# Patient Record
Sex: FEMALE | Race: WHITE | NOT HISPANIC OR LATINO | Employment: OTHER | URBAN - METROPOLITAN AREA
[De-identification: names, ages, dates, MRNs, and addresses within clinical notes are randomized per-mention and may not be internally consistent; named-entity substitution may affect disease eponyms.]

---

## 2023-12-28 ENCOUNTER — OFFICE VISIT (OUTPATIENT)
Dept: PRIMARY CARE | Facility: CLINIC | Age: 68
End: 2023-12-28
Payer: MEDICARE

## 2023-12-28 VITALS
DIASTOLIC BLOOD PRESSURE: 70 MMHG | TEMPERATURE: 97.1 F | BODY MASS INDEX: 23.04 KG/M2 | HEIGHT: 63 IN | RESPIRATION RATE: 15 BRPM | SYSTOLIC BLOOD PRESSURE: 118 MMHG | WEIGHT: 130 LBS | HEART RATE: 66 BPM | OXYGEN SATURATION: 97 %

## 2023-12-28 DIAGNOSIS — I27.21 PULMONARY ARTERY HYPERTENSION ASSOCIATED WITH CONNECTIVE TISSUE DISEASE (MULTI): ICD-10-CM

## 2023-12-28 DIAGNOSIS — G11.9 CEREBELLAR ATAXIA (MULTI): ICD-10-CM

## 2023-12-28 DIAGNOSIS — M35.9 PULMONARY ARTERY HYPERTENSION ASSOCIATED WITH CONNECTIVE TISSUE DISEASE (MULTI): ICD-10-CM

## 2023-12-28 DIAGNOSIS — Z13.820 ENCOUNTER FOR OSTEOPOROSIS SCREENING IN ASYMPTOMATIC POSTMENOPAUSAL PATIENT: ICD-10-CM

## 2023-12-28 DIAGNOSIS — I50.32 CHRONIC DIASTOLIC HEART FAILURE (MULTI): ICD-10-CM

## 2023-12-28 DIAGNOSIS — Z78.0 ENCOUNTER FOR OSTEOPOROSIS SCREENING IN ASYMPTOMATIC POSTMENOPAUSAL PATIENT: ICD-10-CM

## 2023-12-28 DIAGNOSIS — M34.9 SCLERODERMA (MULTI): Primary | ICD-10-CM

## 2023-12-28 DIAGNOSIS — J84.10 PULMONARY INTERSTITIAL FIBROSIS (MULTI): ICD-10-CM

## 2023-12-28 PROCEDURE — 1036F TOBACCO NON-USER: CPT | Performed by: FAMILY MEDICINE

## 2023-12-28 PROCEDURE — 1159F MED LIST DOCD IN RCRD: CPT | Performed by: FAMILY MEDICINE

## 2023-12-28 PROCEDURE — 99203 OFFICE O/P NEW LOW 30 MIN: CPT | Performed by: FAMILY MEDICINE

## 2023-12-28 PROCEDURE — 1160F RVW MEDS BY RX/DR IN RCRD: CPT | Performed by: FAMILY MEDICINE

## 2023-12-28 RX ORDER — LEVOTHYROXINE SODIUM 137 UG/1
137 TABLET ORAL
COMMUNITY
End: 2024-02-15 | Stop reason: SDUPTHER

## 2023-12-28 RX ORDER — AMLODIPINE BESYLATE 10 MG/1
10 TABLET ORAL
COMMUNITY
Start: 2022-02-24 | End: 2024-02-15 | Stop reason: SDUPTHER

## 2023-12-28 RX ORDER — FLUOXETINE 10 MG/1
10 CAPSULE ORAL
COMMUNITY
End: 2024-02-15 | Stop reason: SDUPTHER

## 2023-12-28 RX ORDER — ATORVASTATIN CALCIUM 40 MG/1
40 TABLET, FILM COATED ORAL
COMMUNITY
Start: 2022-02-24 | End: 2024-02-15 | Stop reason: SDUPTHER

## 2023-12-28 RX ORDER — PANTOPRAZOLE SODIUM 40 MG/1
40 TABLET, DELAYED RELEASE ORAL
COMMUNITY
Start: 2022-02-24 | End: 2024-02-15 | Stop reason: SDUPTHER

## 2023-12-28 RX ORDER — ASPIRIN 81 MG/1
81 TABLET ORAL
COMMUNITY
Start: 2022-02-24

## 2023-12-28 RX ORDER — TADALAFIL 20 MG/1
40 TABLET ORAL
COMMUNITY
Start: 2023-12-01

## 2023-12-28 NOTE — PROGRESS NOTES
Assessment     ASSESSMENT/PLAN:      Problem List Items Addressed This Visit       Scleroderma (CMS/[facility]) - Primary    Relevant Orders    Referral to Cardiology    Referral to Rheumatology    Pulmonary artery hypertension associated with connective tissue disease (CMS/[facility])    Relevant Orders    Referral to Pulmonology    Pulmonary interstitial fibrosis (CMS/[facility])    Relevant Orders    Referral to Pulmonology    Cerebellar ataxia (CMS/[facility])     Other Visit Diagnoses       Chronic diastolic heart failure (CMS/[facility])        Relevant Medications    macitentan (Opsumit) 10 mg tablet tablet    tadalafil (tadalafil, antihypertensive,) 20 mg tablet    amLODIPine (Norvasc) 10 mg tablet    Other Relevant Orders    Referral to Cardiology    Encounter for osteoporosis screening in asymptomatic postmenopausal patient        Relevant Orders    XR DEXA bone density            Patient Instructions:  Patient Instructions   Agnosia: try smell retraining   Scleroderma/PAH/diastolic heart failure: Will refer to cardiology, pulmonology, rheumatology  History of osteoporosis: Will send referral for DEXA scan      Signed by: Nickie Velásquez DO       FUTURE DIRECTION:   []    Subjective   SUBJECTIVE:     HPI : Patient is a 68 y.o. female who presents today for the following:     Recently moved from [facility]    Pulmonary arterial hypertension  -Secondary to connective tissue disease  -Following cardiology    Scleroderma  -With systemic symptoms    Agnosia   - ongoing for many years     History of CVA  - has residual weakness in LE R>L and has slurred speech     HTN  - amlodipine 10mg     HLD  - lipitor 40mg daily     Depression   - fuloxetine 10mg     GERD  - pantoprazole     Hypothyroidism   Levothyroxine 137mcg           Review of Systems    History reviewed. No pertinent past medical history.     History reviewed. No pertinent surgical history.     Current Outpatient Medications   Medication Instructions    amLODIPine (NORVASC) 10 mg,  "oral, Daily RT    aspirin 81 mg, oral, Daily RT    atorvastatin (LIPITOR) 40 mg, oral, Daily RT    FLUoxetine (PROZAC) 10 mg, oral, Daily RT    levothyroxine (SYNTHROID, LEVOXYL) 137 mcg, oral, Daily RT    macitentan (OPSUMIT) 10 mg, oral, Daily RT    omega-3 fatty acids-fish oil 340-1,000 mg capsule 1 capsule, oral    pantoprazole (PROTONIX) 40 mg, oral, Daily RT    tadalafil (CIALIS) 40 mg, oral, Daily RT        No Known Allergies     Social History     Socioeconomic History    Marital status:      Spouse name: Not on file    Number of children: Not on file    Years of education: Not on file    Highest education level: Not on file   Occupational History    Not on file   Tobacco Use    Smoking status: Never    Smokeless tobacco: Never   Substance and Sexual Activity    Alcohol use: Not on file    Drug use: Not on file    Sexual activity: Not on file   Other Topics Concern    Not on file   Social History Narrative    Not on file     Social Determinants of Health     Financial Resource Strain: Not on file   Food Insecurity: Not on file   Transportation Needs: Not on file   Physical Activity: Not on file   Stress: Not on file   Social Connections: Not on file   Intimate Partner Violence: Not on file   Housing Stability: Not on file        No family history on file.     Objective     OBJECTIVE:     Vitals:    12/28/23 1328   BP: 118/70   Pulse: 66   Resp: 15   Temp: 36.2 °C (97.1 °F)   SpO2: 97%   Weight: 59 kg (130 lb)   Height: 1.6 m (5' 3\")        Physical Exam  Constitutional:       Appearance: Normal appearance.   HENT:      Head: Normocephalic.   Pulmonary:      Effort: Pulmonary effort is normal.   Musculoskeletal:      Cervical back: Normal range of motion.   Neurological:      Mental Status: She is alert.   Psychiatric:         Mood and Affect: Mood normal.             "

## 2023-12-28 NOTE — PATIENT INSTRUCTIONS
Agnosia: try smell retraining   Scleroderma/PAH/diastolic heart failure: Will refer to cardiology, pulmonology, rheumatology  History of osteoporosis: Will send referral for DEXA scan

## 2024-01-17 ENCOUNTER — APPOINTMENT (OUTPATIENT)
Dept: PULMONOLOGY | Facility: HOSPITAL | Age: 69
End: 2024-01-17
Payer: MEDICARE

## 2024-01-24 ENCOUNTER — APPOINTMENT (OUTPATIENT)
Dept: CARDIOLOGY | Facility: HOSPITAL | Age: 69
End: 2024-01-24
Payer: MEDICARE

## 2024-01-30 ENCOUNTER — HOSPITAL ENCOUNTER (OUTPATIENT)
Dept: RADIOLOGY | Facility: CLINIC | Age: 69
Discharge: HOME | End: 2024-01-30
Payer: MEDICARE

## 2024-01-30 DIAGNOSIS — Z13.820 ENCOUNTER FOR OSTEOPOROSIS SCREENING IN ASYMPTOMATIC POSTMENOPAUSAL PATIENT: ICD-10-CM

## 2024-01-30 DIAGNOSIS — Z78.0 ENCOUNTER FOR OSTEOPOROSIS SCREENING IN ASYMPTOMATIC POSTMENOPAUSAL PATIENT: ICD-10-CM

## 2024-01-30 PROCEDURE — 77080 DXA BONE DENSITY AXIAL: CPT | Performed by: RADIOLOGY

## 2024-01-30 PROCEDURE — 77080 DXA BONE DENSITY AXIAL: CPT

## 2024-01-31 ENCOUNTER — APPOINTMENT (OUTPATIENT)
Dept: RHEUMATOLOGY | Facility: CLINIC | Age: 69
End: 2024-01-31
Payer: MEDICARE

## 2024-02-15 ENCOUNTER — OFFICE VISIT (OUTPATIENT)
Dept: PRIMARY CARE | Facility: CLINIC | Age: 69
End: 2024-02-15
Payer: MEDICARE

## 2024-02-15 VITALS
DIASTOLIC BLOOD PRESSURE: 68 MMHG | HEIGHT: 64 IN | WEIGHT: 126 LBS | HEART RATE: 72 BPM | SYSTOLIC BLOOD PRESSURE: 132 MMHG | BODY MASS INDEX: 21.51 KG/M2 | TEMPERATURE: 98.3 F

## 2024-02-15 DIAGNOSIS — J84.10 PULMONARY INTERSTITIAL FIBROSIS (MULTI): ICD-10-CM

## 2024-02-15 DIAGNOSIS — M34.9 SCLERODERMA (MULTI): ICD-10-CM

## 2024-02-15 DIAGNOSIS — G11.9 CEREBELLAR ATAXIA (MULTI): ICD-10-CM

## 2024-02-15 DIAGNOSIS — K76.6 PAH (PULMONARY ARTERIAL HYPERTENSION) WITH PORTAL HYPERTENSION (MULTI): ICD-10-CM

## 2024-02-15 DIAGNOSIS — Z00.00 ROUTINE GENERAL MEDICAL EXAMINATION AT A HEALTH CARE FACILITY: Primary | ICD-10-CM

## 2024-02-15 DIAGNOSIS — Z12.31 SCREENING MAMMOGRAM FOR BREAST CANCER: ICD-10-CM

## 2024-02-15 DIAGNOSIS — I27.21 PAH (PULMONARY ARTERIAL HYPERTENSION) WITH PORTAL HYPERTENSION (MULTI): ICD-10-CM

## 2024-02-15 DIAGNOSIS — I10 ESSENTIAL HYPERTENSION: ICD-10-CM

## 2024-02-15 DIAGNOSIS — R73.01 IFG (IMPAIRED FASTING GLUCOSE): ICD-10-CM

## 2024-02-15 DIAGNOSIS — M81.0 AGE-RELATED OSTEOPOROSIS WITHOUT CURRENT PATHOLOGICAL FRACTURE: ICD-10-CM

## 2024-02-15 DIAGNOSIS — I50.32 CHRONIC DIASTOLIC HEART FAILURE (MULTI): ICD-10-CM

## 2024-02-15 DIAGNOSIS — I49.3 PREMATURE VENTRICULAR CONTRACTION: ICD-10-CM

## 2024-02-15 DIAGNOSIS — Z12.11 COLON CANCER SCREENING: ICD-10-CM

## 2024-02-15 DIAGNOSIS — E03.9 HYPOTHYROIDISM, UNSPECIFIED TYPE: ICD-10-CM

## 2024-02-15 DIAGNOSIS — I49.9 IRREGULAR HEART BEAT: ICD-10-CM

## 2024-02-15 DIAGNOSIS — F33.42 RECURRENT MAJOR DEPRESSIVE DISORDER, IN FULL REMISSION (CMS-HCC): ICD-10-CM

## 2024-02-15 DIAGNOSIS — E78.2 MIXED HYPERLIPIDEMIA: ICD-10-CM

## 2024-02-15 DIAGNOSIS — K21.9 GASTROESOPHAGEAL REFLUX DISEASE WITHOUT ESOPHAGITIS: ICD-10-CM

## 2024-02-15 DIAGNOSIS — Z12.31 ENCOUNTER FOR SCREENING MAMMOGRAM FOR MALIGNANT NEOPLASM OF BREAST: ICD-10-CM

## 2024-02-15 PROCEDURE — 1159F MED LIST DOCD IN RCRD: CPT | Performed by: FAMILY MEDICINE

## 2024-02-15 PROCEDURE — 1036F TOBACCO NON-USER: CPT | Performed by: FAMILY MEDICINE

## 2024-02-15 PROCEDURE — 1124F ACP DISCUSS-NO DSCNMKR DOCD: CPT | Performed by: FAMILY MEDICINE

## 2024-02-15 PROCEDURE — 3078F DIAST BP <80 MM HG: CPT | Performed by: FAMILY MEDICINE

## 2024-02-15 PROCEDURE — G0439 PPPS, SUBSEQ VISIT: HCPCS | Performed by: FAMILY MEDICINE

## 2024-02-15 PROCEDURE — 93000 ELECTROCARDIOGRAM COMPLETE: CPT | Performed by: FAMILY MEDICINE

## 2024-02-15 PROCEDURE — 1160F RVW MEDS BY RX/DR IN RCRD: CPT | Performed by: FAMILY MEDICINE

## 2024-02-15 PROCEDURE — 99214 OFFICE O/P EST MOD 30 MIN: CPT | Performed by: FAMILY MEDICINE

## 2024-02-15 PROCEDURE — 1170F FXNL STATUS ASSESSED: CPT | Performed by: FAMILY MEDICINE

## 2024-02-15 PROCEDURE — 3075F SYST BP GE 130 - 139MM HG: CPT | Performed by: FAMILY MEDICINE

## 2024-02-15 RX ORDER — ALENDRONATE SODIUM 70 MG/1
70 TABLET ORAL
Qty: 4 TABLET | Refills: 11 | Status: SHIPPED | OUTPATIENT
Start: 2024-02-15 | End: 2025-02-14

## 2024-02-15 RX ORDER — ATORVASTATIN CALCIUM 40 MG/1
40 TABLET, FILM COATED ORAL
Qty: 90 TABLET | Refills: 1 | Status: SHIPPED | OUTPATIENT
Start: 2024-02-15 | End: 2024-08-13

## 2024-02-15 RX ORDER — PANTOPRAZOLE SODIUM 40 MG/1
40 TABLET, DELAYED RELEASE ORAL
Qty: 90 TABLET | Refills: 1 | Status: SHIPPED | OUTPATIENT
Start: 2024-02-15 | End: 2024-08-13

## 2024-02-15 RX ORDER — AMLODIPINE BESYLATE 10 MG/1
10 TABLET ORAL DAILY
Qty: 90 TABLET | Refills: 1 | Status: SHIPPED | OUTPATIENT
Start: 2024-02-15 | End: 2024-08-13

## 2024-02-15 RX ORDER — LEVOTHYROXINE SODIUM 137 UG/1
137 TABLET ORAL DAILY
Qty: 90 TABLET | Refills: 1 | Status: SHIPPED | OUTPATIENT
Start: 2024-02-15 | End: 2024-08-13

## 2024-02-15 RX ORDER — FLUOXETINE 10 MG/1
10 CAPSULE ORAL DAILY
Qty: 90 CAPSULE | Refills: 1 | Status: SHIPPED | OUTPATIENT
Start: 2024-02-15 | End: 2024-08-13

## 2024-02-15 ASSESSMENT — PATIENT HEALTH QUESTIONNAIRE - PHQ9
SUM OF ALL RESPONSES TO PHQ9 QUESTIONS 1 AND 2: 0
1. LITTLE INTEREST OR PLEASURE IN DOING THINGS: NOT AT ALL
2. FEELING DOWN, DEPRESSED OR HOPELESS: NOT AT ALL

## 2024-02-15 ASSESSMENT — ACTIVITIES OF DAILY LIVING (ADL)
DOING_HOUSEWORK: INDEPENDENT
MANAGING_FINANCES: INDEPENDENT
GROCERY_SHOPPING: INDEPENDENT
DRESSING: INDEPENDENT
BATHING: INDEPENDENT
TAKING_MEDICATION: INDEPENDENT

## 2024-02-15 NOTE — PATIENT INSTRUCTIONS
1. Routine general medical examination at a health care facility    - Basic Metabolic Panel; Future  - Lipid Panel; Future    2. Encounter for screening mammogram for malignant neoplasm of breast  Referral placed for mammogram  - BI mammo bilateral screening tomosynthesis; Future    3. Hypothyroidism, unspecified type  Need to recheck TSH, levothyroxine refilled  - levothyroxine (Synthroid, Levoxyl) 137 mcg tablet; Take 1 tablet (137 mcg) by mouth once daily.  Dispense: 90 tablet; Refill: 1  - Tsh With Reflex To Free T4 If Abnormal; Future    4. PAH (pulmonary arterial hypertension) with portal hypertension (CMS/HCC)  Patient will contact pulmonology for tadalafil refill, has an appointment coming up soon    5. Scleroderma (CMS/HCC)  Patient has yet to find a rheumatologist    6. Chronic diastolic heart failure (CMS/HCC)      7. Pulmonary interstitial fibrosis (CMS/HCC)    8. Cerebellar ataxia (CMS/HCC)      9. Gastroesophageal reflux disease without esophagitis  Controlled with pantoprazole 40 mg daily  - pantoprazole (ProtoNix) 40 mg EC tablet; Take 1 tablet (40 mg) by mouth once daily.  Dispense: 90 tablet; Refill: 1    10. Recurrent major depressive disorder, in full remission (CMS/HCC)  Has been experiencing increased stress at home however mood has been stable with Prozac 10 mg daily  - FLUoxetine (PROzac) 10 mg capsule; Take 1 capsule (10 mg) by mouth once daily.  Dispense: 90 capsule; Refill: 1    11. Essential hypertension  Controlled with amlodipine 10 mg daily  - amLODIPine (Norvasc) 10 mg tablet; Take 1 tablet (10 mg) by mouth once daily.  Dispense: 90 tablet; Refill: 1    12. Age-related osteoporosis without current pathological fracture  DEXA scan + osteoporosis, will order Fosamax for patient to take weekly    - alendronate (Fosamax) 70 mg tablet; Take 1 tablet (70 mg) by mouth every 7 days. Take in the morning with a full glass of water, on an empty stomach, and do not take anything else by mouth or  lie down for the next 30 min.  Dispense: 4 tablet; Refill: 11  - Vitamin D 25-Hydroxy,Total (for eval of Vitamin D levels); Future    13. Mixed hyperlipidemia    - atorvastatin (Lipitor) 40 mg tablet; Take 1 tablet (40 mg) by mouth once daily.  Dispense: 90 tablet; Refill: 1    14. IFG (impaired fasting glucose)    - Hemoglobin A1C; Future    15. Colon cancer screening  Colonoscopy referral placed  - Colonoscopy Screening; Average Risk Patient; Future    16. Screening mammogram for breast cancer    - BI mammo bilateral screening tomosynthesis; Future    17. Irregular heart beat    - ECG 12 Lead    18. Premature ventricular contraction  EKG done in office, shows PVCs with sinus rhythm, no ST changes, discussed lifestyle modifications and continue follow-up with cardiology

## 2024-02-15 NOTE — PROGRESS NOTES
Assessment/Plan    ASSESSMENT/PLAN:      Patient Instructions   1. Routine general medical examination at a health care facility    - Basic Metabolic Panel; Future  - Lipid Panel; Future    2. Encounter for screening mammogram for malignant neoplasm of breast  Referral placed for mammogram  - BI mammo bilateral screening tomosynthesis; Future    3. Hypothyroidism, unspecified type  Need to recheck TSH, levothyroxine refilled  - levothyroxine (Synthroid, Levoxyl) 137 mcg tablet; Take 1 tablet (137 mcg) by mouth once daily.  Dispense: 90 tablet; Refill: 1  - Tsh With Reflex To Free T4 If Abnormal; Future    4. PAH (pulmonary arterial hypertension) with portal hypertension (CMS/HCC)  Patient will contact pulmonology for tadalafil refill, has an appointment coming up soon    5. Scleroderma (CMS/HCC)  Patient has yet to find a rheumatologist    6. Chronic diastolic heart failure (CMS/HCC)      7. Pulmonary interstitial fibrosis (CMS/HCC)    8. Cerebellar ataxia (CMS/HCC)      9. Gastroesophageal reflux disease without esophagitis  Controlled with pantoprazole 40 mg daily  - pantoprazole (ProtoNix) 40 mg EC tablet; Take 1 tablet (40 mg) by mouth once daily.  Dispense: 90 tablet; Refill: 1    10. Recurrent major depressive disorder, in full remission (CMS/HCC)  Has been experiencing increased stress at home however mood has been stable with Prozac 10 mg daily  - FLUoxetine (PROzac) 10 mg capsule; Take 1 capsule (10 mg) by mouth once daily.  Dispense: 90 capsule; Refill: 1    11. Essential hypertension  Controlled with amlodipine 10 mg daily  - amLODIPine (Norvasc) 10 mg tablet; Take 1 tablet (10 mg) by mouth once daily.  Dispense: 90 tablet; Refill: 1    12. Age-related osteoporosis without current pathological fracture  DEXA scan + osteoporosis, will order Fosamax for patient to take weekly    - alendronate (Fosamax) 70 mg tablet; Take 1 tablet (70 mg) by mouth every 7 days. Take in the morning with a full glass of  water, on an empty stomach, and do not take anything else by mouth or lie down for the next 30 min.  Dispense: 4 tablet; Refill: 11  - Vitamin D 25-Hydroxy,Total (for eval of Vitamin D levels); Future    13. Mixed hyperlipidemia    - atorvastatin (Lipitor) 40 mg tablet; Take 1 tablet (40 mg) by mouth once daily.  Dispense: 90 tablet; Refill: 1    14. IFG (impaired fasting glucose)    - Hemoglobin A1C; Future    15. Colon cancer screening  Colonoscopy referral placed  - Colonoscopy Screening; Average Risk Patient; Future    16. Screening mammogram for breast cancer    - BI mammo bilateral screening tomosynthesis; Future    17. Irregular heart beat    - ECG 12 Lead    18. Premature ventricular contraction  EKG done in office, shows PVCs with sinus rhythm, no ST changes, discussed lifestyle modifications and continue follow-up with cardiology           FUTURE DIRECTION:   []    Subjective   SUBJECTIVE:     Reason for Visit: Angelica Craven is an 68 y.o. female here for a Medicare Wellness visit.      Pulmonary arterial hypertension  -Secondary to connective tissue disease  -Following cardiology     Scleroderma  -With systemic symptoms like dysphagia, microstomia   - has not been following with dermatology      IgG lambda MGUS  Following hematology oncology yearly   No evidence of end organ damage     Agnosia   - ongoing for many years      History of CVA  - has residual weakness in LE R>L and has slurred speech      HTN  - amlodipine 10mg      HLD  - lipitor 40mg daily      Depression   - fuloxetine 10mg      GERD  - pantoprazole      Hypothyroidism   Levothyroxine 137mcg       Care Team   Cardiology: Ramona vazquez  Pulmonology: Soledad Yap     Past Medical, Surgical, and Family History reviewed and updated in chart.    Reviewed all medications by prescribing practitioner or clinical pharmacist (such as prescriptions, OTCs, herbal therapies and supplements) and documented in the medical record.    Patient Care  "Team:  Nickie Velásquez DO as PCP - General (Family Medicine)  Zander Dover MD as PCP - [employer] PCP     Review of Systems    Objective   OBJECTIVE:     Vitals:  /68   Pulse 72 Comment: irregular  Temp 36.8 °C (98.3 °F)   Ht 1.613 m (5' 3.5\")   Wt 57.2 kg (126 lb)   BMI 21.97 kg/m²       Physical Exam  HENT:      Head: Normocephalic and atraumatic.      Nose: Nose normal.      Mouth/Throat:      Mouth: Mucous membranes are moist.   Eyes:      Pupils: Pupils are equal, round, and reactive to light.   Cardiovascular:      Rate and Rhythm: Normal rate. Rhythm irregular.      Pulses: Normal pulses.      Heart sounds: No murmur heard.  Pulmonary:      Effort: Pulmonary effort is normal.      Breath sounds: Normal breath sounds.   Abdominal:      Tenderness: There is no abdominal tenderness.   Musculoskeletal:         General: Normal range of motion.      Cervical back: Normal range of motion.   Skin:     General: Skin is warm and dry.   Neurological:      Mental Status: She is alert.   Psychiatric:         Mood and Affect: Mood normal.            "

## 2024-02-20 ENCOUNTER — TELEPHONE (OUTPATIENT)
Dept: GASTROENTEROLOGY | Facility: CLINIC | Age: 69
End: 2024-02-20
Payer: MEDICARE

## 2024-02-20 NOTE — TELEPHONE ENCOUNTER
Left message on machine to return call  - needs OV for OA fail  heart issues, on oxygen, stroke 2021

## 2024-02-20 NOTE — TELEPHONE ENCOUNTER
----- Message from Rachel Saravia RN sent at 2/19/2024  3:46 PM EST -----  Regarding: Colonoscopy screening  Office visit

## 2024-03-06 ENCOUNTER — HOSPITAL ENCOUNTER (OUTPATIENT)
Dept: RADIOLOGY | Facility: HOSPITAL | Age: 69
Discharge: HOME | End: 2024-03-06
Payer: MEDICARE

## 2024-03-06 DIAGNOSIS — Z12.31 SCREENING MAMMOGRAM FOR BREAST CANCER: ICD-10-CM

## 2024-03-06 PROCEDURE — 77067 SCR MAMMO BI INCL CAD: CPT

## 2024-03-06 PROCEDURE — 77067 SCR MAMMO BI INCL CAD: CPT | Performed by: RADIOLOGY

## 2024-03-06 PROCEDURE — 77063 BREAST TOMOSYNTHESIS BI: CPT | Performed by: RADIOLOGY

## 2024-03-14 ENCOUNTER — HOSPITAL ENCOUNTER (OUTPATIENT)
Dept: RADIOLOGY | Facility: EXTERNAL LOCATION | Age: 69
Discharge: HOME | End: 2024-03-14

## 2024-03-19 ENCOUNTER — TELEPHONE (OUTPATIENT)
Dept: PRIMARY CARE | Facility: CLINIC | Age: 69
End: 2024-03-19
Payer: MEDICARE

## 2024-03-19 NOTE — TELEPHONE ENCOUNTER
----- Message from Nickie Velásquez DO sent at 3/19/2024  7:54 AM EDT -----  Please let pt know that mammogram is normal. Continue yearly screening.

## 2024-04-19 ENCOUNTER — OFFICE VISIT (OUTPATIENT)
Dept: GASTROENTEROLOGY | Facility: CLINIC | Age: 69
End: 2024-04-19
Payer: MEDICARE

## 2024-04-19 VITALS
SYSTOLIC BLOOD PRESSURE: 138 MMHG | BODY MASS INDEX: 22.5 KG/M2 | HEIGHT: 63 IN | DIASTOLIC BLOOD PRESSURE: 76 MMHG | WEIGHT: 127 LBS

## 2024-04-19 DIAGNOSIS — D12.6 ADENOMATOUS POLYP OF COLON, UNSPECIFIED PART OF COLON: Primary | ICD-10-CM

## 2024-04-19 PROCEDURE — 3075F SYST BP GE 130 - 139MM HG: CPT | Performed by: INTERNAL MEDICINE

## 2024-04-19 PROCEDURE — 1036F TOBACCO NON-USER: CPT | Performed by: INTERNAL MEDICINE

## 2024-04-19 PROCEDURE — 1159F MED LIST DOCD IN RCRD: CPT | Performed by: INTERNAL MEDICINE

## 2024-04-19 PROCEDURE — 99204 OFFICE O/P NEW MOD 45 MIN: CPT | Performed by: INTERNAL MEDICINE

## 2024-04-19 PROCEDURE — 3078F DIAST BP <80 MM HG: CPT | Performed by: INTERNAL MEDICINE

## 2024-04-19 PROCEDURE — 1160F RVW MEDS BY RX/DR IN RCRD: CPT | Performed by: INTERNAL MEDICINE

## 2024-04-19 NOTE — PROGRESS NOTES
[facility]    ASSESSMENT and PLAN:       Angelica Craven is a 69 y.o. female with a significant past medical history of scleroderma, pulmonary hypertension, pulm interstitial fibrosis, cerebellar ataxia, chronic diastolic heart failure who presents for consultation requested by her primary care provider (Nickie Velásquez DO) for the evaluation of open access failed.       History of colon polyps  -Patient with colonoscopy in 2018 in [place] recommend repeat in 5 years  -We will plan on colonoscopy in the OR due to patient underlying pulmonary hypertension  Risk and benefits of the endoscopic procedure including bleeding perforation and infection were discussed with patient and they wish to proceed              Orville Rehman DO         Gastroenterology    [facility]            Subjective   HISTORY OF PRESENT ILLNESS:     Chief Complaint  New Patient Visit (OA FAIL)    History Of Present Illness:    Angelica Craven is a 69 y.o. female with a significant past medical history of scleroderma, pulmonary hypertension, pulm interstitial fibrosis, cerebellar ataxia, chronic diastolic heart failure who presents for consultation requested by her primary care provider (Nickie Velásquez DO) for the evaluation of open access failed.       Patient denies any heartburn/GERD, N/V, dysphagia, odynophagia, abdominal pain, diarrhea, constipation, hematemesis, hematochezia, melena, or weight loss.      The liver biopsy results show a mild resolving hepatitis with NO fibrosis. She does not have cirrhosis or autoimmune hepatitis. The mild inflammation is likely related to her rheumatologic disorder (CREST/Scleroderma) and we can follow her liver numbers over time.    Her upper endoscopy was also reassuring - we did this as there was a question of a stomach mass on prior imaging. Her esophagus, stomach and small intestine were normal, also with  normal biopsies.      Endoscopy History:    Colon in [place] with    Patient Name: Angelica Craven Procedure Date: 12/7/2018 9:03 AM  YOB: 1955 Attending MD: ILAN BELLE MD  Age: 63 Note Status: Finalized  Account Number: [id number] MRN: [id number]  _______________________________________________________________________________    Procedure: Colonoscopy  Indications: Screening for colorectal malignant neoplasm, Last   colonoscopy 7 years ago  Providers: ILAN BELLE MD (Doctor), JIGNESH ALVAREZ, SMITH   (Nurse), DEBBIE CONTI RN (Nurse)  Referring MD: JOSE GOTTLIEB DO  Medicines:  Fentanyl 100 micrograms IV, Midazolam 6 mg IV  Complications: No immediate complications.  Procedure: Pre-Anesthesia Assessment:  - Pre-Anesthesia Assessment:  - Prior to the procedure, a History and Physical was   performed, and patient medication allergies have   been reviewed.Â  The patient's tolerance of previous   anesthesia has been reviewed.  - Review of systems indicates patient cleared for   procedure.  - The risks and benefits of the procedure and the   sedation options and risks were discussed with the   patient. All questions were answered and informed   consent was obtained.  - Patient identification and proposed procedure were   verified prior to the procedure by the physician and   the nurse.Â  The procedure was verified in the   procedure room.  - ASA Grade Assessment: II - A patient with mild   systemic disease.  - The anesthesia plan was to use moderate   sedation/analgesia (conscious sedation).  - The medication was administered by the nurse under   the direct supervision of the physician.  - The heart rate, respiratory rate, oxygen   saturations, blood pressure, adequacy of pulmonary   ventilation, and response to care were monitored   throughout the procedure by the nurse under the   direct supervision of the physician.  - The physical status of the patient was re-assessed   after the  procedure.  - Sedation began at: 09:12 AM  - The patient left the room at: 09:32 AM  After I obtained informed consent, the scope was   passed under direct vision. Throughout the   procedure, the patient's blood pressure, pulse, and   oxygen saturations were monitored continuously. The   OLYMPUS CF-H180AL COLONOSCOPE was introduced through   the anus and advanced to the cecum, identified by   appendiceal orifice and ileocecal valve. The   colonoscopy was performed without difficulty. The   patient tolerated the procedure well. The quality of   the bowel preparation was good.  Findings:  The perianal and digital rectal examinations were normal.  Two sessile polyps were found in the rectum. The polyps were small in   size. These polyps were removed with a jumbo cold forceps. Resection and   retrieval were complete.  A few small-mouthed diverticula were found in the sigmoid colon and in   the descending colon.  A few small angiodysplastic lesions without bleeding were found in the   sigmoid colon.  No additional abnormalities were found on retroflexion.  Impression:  - Two small polyps in the rectum. Resected and retrieved.  - Diverticulosis in the sigmoid colon and in the descending colon.  - A few non-bleeding colonic angiodysplastic lesions.  Estimated Blood Loss: Estimated blood loss: none.  Recommendation:  - Await pathology results.  - Repeat colonoscopy for surveillance based on pathology results.   Review of systems:   Review of Systems      I performed a complete 10 point review of systems and it is negative except as noted in HPI or above.        PAST HISTORIES:       Past Medical History:  She has no past medical history on file.    Past Surgical History:  She has a past surgical history that includes Breast biopsy.      Social History:  She reports that she has never smoked. She has never used smokeless tobacco. She reports that she does not drink alcohol and does not use drugs.    Family History:  No known  "GI disease, specifically denies pancreatitis, Crohn's, colon cancer, gastroesophageal cancer, or ulcerative colitis.    No family history on file.     Allergies:  Patient has no known allergies.        Objective   OBJECTIVE:       Last Recorded Vitals:  Vitals:    04/19/24 1100   BP: 138/76   Weight: 57.6 kg (127 lb)   Height: 1.6 m (5' 3\")     /76   Ht 1.6 m (5' 3\")   Wt 57.6 kg (127 lb)   BMI 22.50 kg/m²      Physical Exam:    Physical Exam  Vitals reviewed.   Constitutional:       General: She is awake.      Appearance: Normal appearance.   HENT:      Head: Normocephalic.      Mouth/Throat:      Mouth: Mucous membranes are moist.   Eyes:      Extraocular Movements: Extraocular movements intact.   Cardiovascular:      Rate and Rhythm: Normal rate.      Heart sounds: Normal heart sounds.   Pulmonary:      Effort: Pulmonary effort is normal.      Breath sounds: Normal breath sounds.   Abdominal:      General: Bowel sounds are normal.      Palpations: Abdomen is soft.      Tenderness: There is no abdominal tenderness. There is no guarding or rebound.      Hernia: No hernia is present.   Musculoskeletal:         General: Normal range of motion.      Cervical back: Neck supple.   Skin:     General: Skin is warm and dry.   Neurological:      General: No focal deficit present.      Mental Status: She is alert.   Psychiatric:         Attention and Perception: Attention and perception normal.         Mood and Affect: Mood normal.         Behavior: Behavior normal.             Home Medications:  Prior to Admission medications    Medication Sig Start Date End Date Taking? Authorizing Provider   alendronate (Fosamax) 70 mg tablet Take 1 tablet (70 mg) by mouth every 7 days. Take in the morning with a full glass of water, on an empty stomach, and do not take anything else by mouth or lie down for the next 30 min. 2/15/24 2/14/25  Nickie Velásquez,    amLODIPine (Norvasc) 10 mg tablet Take 1 tablet (10 mg) by " "mouth once daily. 2/15/24 8/13/24  Nickie Velásquez, DO   aspirin 81 mg EC tablet Take 1 tablet (81 mg) by mouth once daily. 2/24/22   Historical Provider, MD   atorvastatin (Lipitor) 40 mg tablet Take 1 tablet (40 mg) by mouth once daily. 2/15/24 8/13/24  Nickie Velásquez, DO   FLUoxetine (PROzac) 10 mg capsule Take 1 capsule (10 mg) by mouth once daily. 2/15/24 8/13/24  Nickie Velásquez, DO   levothyroxine (Synthroid, Levoxyl) 137 mcg tablet Take 1 tablet (137 mcg) by mouth once daily. 2/15/24 8/13/24  Nickie Velásquez, DO   macitentan (Opsumit) 10 mg tablet tablet Take 1 tablet (10 mg) by mouth once daily. 2/17/23   Historical Provider, MD   omega-3 fatty acids-fish oil 340-1,000 mg capsule Take 1 capsule by mouth.    Historical Provider, MD   pantoprazole (ProtoNix) 40 mg EC tablet Take 1 tablet (40 mg) by mouth once daily. 2/15/24 8/13/24  Nickie Velásquez, DO   tadalafil (tadalafil, antihypertensive,) 20 mg tablet Take 2 tablets (40 mg) by mouth once daily. 12/1/23   Historical Provider, MD         Relevant Results Recent labs reviewed in the EMR.  No results found for: \"HGB\", \"MCV\", \"PLT\"    No results found for: \"FERRITIN\", \"IRON\"    No results found for: \"NA\", \"K\", \"CL\", \"BUN\", \"CREATININE\"    No results found for: \"BILITOT\"  No results found for: \"ALT\", \"AST\", \"ALKPHOS\"    No results found for: \"CRP\"    No results found for: \"CALPS\"    Radiology: Reviewed imaging reviewed in the EMR.  No results found.      "

## 2024-04-19 NOTE — PATIENT INSTRUCTIONS
You have been scheduled for a colonoscopy.  You were given instructions for preparing for this test in the office today.  If you have questions about these instructions, please call my office at [phone].    After your procedure, you can expect me to talk to you to go over the results of the procedure. If polyps were removed I will usually be able to tell you my initial thoughts about those polyps and give you some idea of when you should have another colonoscopy.  \    You were also given information regarding the schedule for your procedure including the time that you need to arrive to the endoscopy unit.  You will also be contacted 2-3 day prior to your procedure to confirm the final arrival time.  If you have questions about this or if you need to cancel or change this appointment please call my office at [phone].

## 2024-05-10 ENCOUNTER — PRE-ADMISSION TESTING (OUTPATIENT)
Dept: PREADMISSION TESTING | Facility: HOSPITAL | Age: 69
End: 2024-05-10
Payer: MEDICARE

## 2024-05-10 VITALS
RESPIRATION RATE: 20 BRPM | TEMPERATURE: 98.6 F | SYSTOLIC BLOOD PRESSURE: 100 MMHG | OXYGEN SATURATION: 99 % | WEIGHT: 125 LBS | HEIGHT: 63 IN | DIASTOLIC BLOOD PRESSURE: 62 MMHG | HEART RATE: 102 BPM | BODY MASS INDEX: 22.15 KG/M2

## 2024-05-10 DIAGNOSIS — I27.21 PAH (PULMONARY ARTERIAL HYPERTENSION) WITH PORTAL HYPERTENSION (MULTI): ICD-10-CM

## 2024-05-10 DIAGNOSIS — Z01.818 PRE-OP EVALUATION: Primary | ICD-10-CM

## 2024-05-10 DIAGNOSIS — K76.6 PAH (PULMONARY ARTERIAL HYPERTENSION) WITH PORTAL HYPERTENSION (MULTI): ICD-10-CM

## 2024-05-10 DIAGNOSIS — I10 ESSENTIAL HYPERTENSION: ICD-10-CM

## 2024-05-10 DIAGNOSIS — K21.9 GASTROESOPHAGEAL REFLUX DISEASE WITHOUT ESOPHAGITIS: ICD-10-CM

## 2024-05-10 DIAGNOSIS — M34.9 SCLERODERMA (MULTI): ICD-10-CM

## 2024-05-10 DIAGNOSIS — Z86.010 HX OF COLONIC POLYPS: ICD-10-CM

## 2024-05-10 DIAGNOSIS — J84.10 PULMONARY INTERSTITIAL FIBROSIS (MULTI): ICD-10-CM

## 2024-05-10 LAB
ANION GAP SERPL CALC-SCNC: 11 MMOL/L (ref 10–20)
BUN SERPL-MCNC: 17 MG/DL (ref 6–23)
CALCIUM SERPL-MCNC: 8.8 MG/DL (ref 8.6–10.3)
CHLORIDE SERPL-SCNC: 108 MMOL/L (ref 98–107)
CO2 SERPL-SCNC: 23 MMOL/L (ref 21–32)
CREAT SERPL-MCNC: 0.65 MG/DL (ref 0.5–1.05)
EGFRCR SERPLBLD CKD-EPI 2021: >90 ML/MIN/1.73M*2
ERYTHROCYTE [DISTWIDTH] IN BLOOD BY AUTOMATED COUNT: 14 % (ref 11.5–14.5)
GLUCOSE SERPL-MCNC: 95 MG/DL (ref 74–99)
HCT VFR BLD AUTO: 38.8 % (ref 36–46)
HGB BLD-MCNC: 12.9 G/DL (ref 12–16)
MCH RBC QN AUTO: 29.5 PG (ref 26–34)
MCHC RBC AUTO-ENTMCNC: 33.2 G/DL (ref 32–36)
MCV RBC AUTO: 89 FL (ref 80–100)
NRBC BLD-RTO: 0 /100 WBCS (ref 0–0)
PLATELET # BLD AUTO: 189 X10*3/UL (ref 150–450)
POTASSIUM SERPL-SCNC: 4 MMOL/L (ref 3.5–5.3)
RBC # BLD AUTO: 4.37 X10*6/UL (ref 4–5.2)
SODIUM SERPL-SCNC: 138 MMOL/L (ref 136–145)
WBC # BLD AUTO: 6.5 X10*3/UL (ref 4.4–11.3)

## 2024-05-10 PROCEDURE — 99203 OFFICE O/P NEW LOW 30 MIN: CPT | Performed by: CLINICAL NURSE SPECIALIST

## 2024-05-10 PROCEDURE — 80048 BASIC METABOLIC PNL TOTAL CA: CPT

## 2024-05-10 PROCEDURE — 36415 COLL VENOUS BLD VENIPUNCTURE: CPT

## 2024-05-10 PROCEDURE — 85027 COMPLETE CBC AUTOMATED: CPT

## 2024-05-10 RX ORDER — ALBUTEROL SULFATE 90 UG/1
2 AEROSOL, METERED RESPIRATORY (INHALATION) EVERY 6 HOURS PRN
COMMUNITY

## 2024-05-10 ASSESSMENT — ENCOUNTER SYMPTOMS
EYES NEGATIVE: 1
GASTROINTESTINAL NEGATIVE: 1
CARDIOVASCULAR NEGATIVE: 1
CONSTITUTIONAL NEGATIVE: 1
FACIAL ASYMMETRY: 1
ENDOCRINE NEGATIVE: 1
SHORTNESS OF BREATH: 1
PSYCHIATRIC NEGATIVE: 1
HEMATOLOGIC/LYMPHATIC NEGATIVE: 1
ARTHRALGIAS: 1

## 2024-05-10 ASSESSMENT — LIFESTYLE VARIABLES: SMOKING_STATUS: NONSMOKER

## 2024-05-10 NOTE — CPM/PAT H&P
CPM/PAT Evaluation       Name: Angelica Craven (Angelica Craven)  /Age: 1955/69 y.o.     In-Person       Chief Complaint:  Hx of colon polyps. Scheduled for Colonoscopy under MAC anesthesia per Dr. Rehman on 24.       Past Medical History:   Diagnosis Date    CHF (congestive heart failure) (Multi)     Dental disease     Hearing aid worn     HL (hearing loss)     Hyperlipidemia     Shortness of breath     Stroke (Multi)     Vision loss        Past Surgical History:   Procedure Laterality Date    BREAST BIOPSY         Patient  has no history on file for sexual activity.    Family History   Problem Relation Name Age of Onset    Coronary artery disease Mother          CABG 3 V    Other (pacemaker) Father      Diabetes Sister         No Known Allergies    Prior to Admission medications    Medication Sig Start Date End Date Taking? Authorizing Provider   albuterol (Ventolin HFA) 90 mcg/actuation inhaler Inhale 2 puffs every 6 hours if needed for wheezing.    Historical Provider, MD   alendronate (Fosamax) 70 mg tablet Take 1 tablet (70 mg) by mouth every 7 days. Take in the morning with a full glass of water, on an empty stomach, and do not take anything else by mouth or lie down for the next 30 min. 2/15/24 2/14/25  Nickie Velásquez DO   amLODIPine (Norvasc) 10 mg tablet Take 1 tablet (10 mg) by mouth once daily. 2/15/24 8/13/24  Nickie Velásquez DO   aspirin 81 mg EC tablet Take 1 tablet (81 mg) by mouth once daily. 22   Historical Provider, MD   atorvastatin (Lipitor) 40 mg tablet Take 1 tablet (40 mg) by mouth once daily. 2/15/24 8/13/24  Nickie Velásquez DO   FLUoxetine (PROzac) 10 mg capsule Take 1 capsule (10 mg) by mouth once daily. 2/15/24 8/13/24  Nickie Velásquez DO   levothyroxine (Synthroid, Levoxyl) 137 mcg tablet Take 1 tablet (137 mcg) by mouth once daily. 2/15/24 8/13/24  Nickie Velásquez DO   macitentan (Opsumit) 10 mg tablet tablet Take 1 tablet (10 mg) by  mouth once daily. 2/17/23   Historical Provider, MD   omega-3 fatty acids-fish oil 340-1,000 mg capsule Take 1 capsule by mouth.    Historical Provider, MD   pantoprazole (ProtoNix) 40 mg EC tablet Take 1 tablet (40 mg) by mouth once daily. 2/15/24 8/13/24  Nickie Velásquez DO   tadalafil (tadalafil, antihypertensive,) 20 mg tablet Take 2 tablets (40 mg) by mouth once daily. 12/1/23   Historical Provider, MD        Visit Vitals  /62   Pulse 102   Temp 37 °C (98.6 °F)   Resp 20       DASI Risk Score    No data to display       Caprini DVT Assessment      Flowsheet Row Most Recent Value   DVT Score 10   Current Status Minor surgery planned   History Prior major surgery, Stroke   Age 60-75 years   BMI 30 or less          Modified Frailty Index    No data to display       CHADS2 Stroke Risk  Current as of 23 minutes ago        N/A 3 to 100%: High Risk   2 to < 3%: Medium Risk   0 to < 2%: Low Risk     Last Change: N/A          This score determines the patient's risk of having a stroke if the patient has atrial fibrillation.        This score is not applicable to this patient. Components are not calculated.          Revised Cardiac Risk Index      Flowsheet Row Most Recent Value   Revised Cardiac Risk Calculator 2          Apfel Simplified Score      Flowsheet Row Most Recent Value   Apfel Simplified Score Calculator 2          Risk Analysis Index Results This Encounter    No data found in the last 1 encounters.       Stop Bang Score      Flowsheet Row Most Recent Value   Do you snore loudly? 0   Do you often feel tired or fatigued after your sleep? 0   Has anyone ever observed you stop breathing in your sleep? 0   Do you have or are you being treated for high blood pressure? 1   Recent BMI (Calculated) 22.5   Is BMI greater than 35 kg/m2? 0=No   Age older than 50 years old? 1=Yes   Is your neck circumference greater than 17 inches (Male) or 16 inches (Female)? 0   Gender - Male 0=No   STOP-BANG Total Score 2             Assessment and Plan:     Gastrointestinal:  Hx of colon polyps. Scheduled for Colonoscopy under MAC anesthesia per Dr. Rehman on 5/20/24. See H&P.

## 2024-05-10 NOTE — H&P
History Of Present Illness  Angelica Craven is a very pleasant 69 y.o. female presenting with Hx of colon polyps. Scheduled for Colonoscopy under MAC anesthesia per Dr. Rehman on 5/20/24. Currently denies n/v/d/c. Denies abdominal pain. Hx of GERD.      Past Medical History  Past Medical History:   Diagnosis Date    CHF (congestive heart failure) (Multi)     Dental disease     Hearing aid worn     HL (hearing loss)     Hyperlipidemia     Shortness of breath     Stroke (Multi)     Vision loss    Scleroderma/ Fibrosis with Pulmonary arterial hypertension  Diastolic HF  Pulmonary interstitial fibrosis   GERD  Cerebellar ataxia   CVA with right sided weakness and chronic facial droop - March 2001  Fall risk       Surgical History  Past Surgical History:   Procedure Laterality Date    BREAST BIOPSY          Social History  She reports that she has never smoked. She has never used smokeless tobacco. She reports that she does not drink alcohol and does not use drugs.    Family History  Family History   Problem Relation Name Age of Onset    Coronary artery disease Mother          CABG 3 V    Other (pacemaker) Father      Diabetes Sister          Allergies  Patient has no known allergies.    Review of Systems   Constitutional: Negative.    HENT: Negative.     Eyes: Negative.    Respiratory:  Positive for shortness of breath.         Chronic   Cardiovascular: Negative.    Gastrointestinal: Negative.         Hx colon polyps/GERD.   Endocrine: Negative.    Genitourinary: Negative.    Musculoskeletal:  Positive for arthralgias.   Skin: Negative.    Allergic/Immunologic:        Scleroderma, fibrosis. Chronic, severe pain in anna hands and hip joints.    Neurological:  Positive for facial asymmetry.        Hx CVA affecting right side.    Hematological: Negative.    Psychiatric/Behavioral: Negative.     All other systems reviewed and are negative.       Physical Exam  Vitals and nursing note reviewed.   Constitutional:        "Appearance: Normal appearance.   HENT:      Head: Normocephalic.      Comments: Right facial droop.   Mallampati: 2  TMD: <3  Finger breadth: 3  Dentition: Pt states has loose right upper tooth.        Nose: Nose normal.   Eyes:      Pupils: Pupils are equal, round, and reactive to light.   Cardiovascular:      Rate and Rhythm: Normal rate. Rhythm irregular. Frequent Extrasystoles are present.     Heart sounds: Normal heart sounds, S1 normal and S2 normal.      Comments: PAC's noted on previous EKG. Trace edema.   Arsalan hand edema.   Pulmonary:      Effort: Pulmonary effort is normal.      Breath sounds: Decreased air movement present. Examination of the right-upper field reveals decreased breath sounds. Examination of the right-middle field reveals decreased breath sounds. Examination of the right-lower field reveals decreased breath sounds. Decreased breath sounds present.      Comments: Right posterior lung fields diminished compared to left.   Abdominal:      General: Bowel sounds are normal.      Palpations: Abdomen is soft.   Musculoskeletal:         General: Normal range of motion.      Cervical back: Normal range of motion.      Right lower leg: Edema present.      Left lower leg: Edema present.   Skin:     General: Skin is warm and dry.      Findings: Petechiae (and Purpura) present.      Comments: Arsalan hand edema; blanching of fingers/hands. Scleroderma.    Neurological:      General: No focal deficit present.      Mental Status: She is alert and oriented to person, place, and time.   Psychiatric:         Mood and Affect: Mood normal.         Behavior: Behavior normal.         Thought Content: Thought content normal.         Judgment: Judgment normal.          Last Recorded Vitals  Blood pressure 100/62, pulse 102, temperature 37 °C (98.6 °F), resp. rate 20, height 1.6 m (5' 3\"), weight 56.7 kg (125 lb), SpO2 99%.    Relevant Results    Current Outpatient Medications:     albuterol (Ventolin HFA) 90 " mcg/actuation inhaler, Inhale 2 puffs every 6 hours if needed for wheezing., Disp: , Rfl:     alendronate (Fosamax) 70 mg tablet, Take 1 tablet (70 mg) by mouth every 7 days. Take in the morning with a full glass of water, on an empty stomach, and do not take anything else by mouth or lie down for the next 30 min., Disp: 4 tablet, Rfl: 11    amLODIPine (Norvasc) 10 mg tablet, Take 1 tablet (10 mg) by mouth once daily., Disp: 90 tablet, Rfl: 1    aspirin 81 mg EC tablet, Take 1 tablet (81 mg) by mouth once daily., Disp: , Rfl:     atorvastatin (Lipitor) 40 mg tablet, Take 1 tablet (40 mg) by mouth once daily., Disp: 90 tablet, Rfl: 1    FLUoxetine (PROzac) 10 mg capsule, Take 1 capsule (10 mg) by mouth once daily., Disp: 90 capsule, Rfl: 1    levothyroxine (Synthroid, Levoxyl) 137 mcg tablet, Take 1 tablet (137 mcg) by mouth once daily., Disp: 90 tablet, Rfl: 1    macitentan (Opsumit) 10 mg tablet tablet, Take 1 tablet (10 mg) by mouth once daily., Disp: , Rfl:     omega-3 fatty acids-fish oil 340-1,000 mg capsule, Take 1 capsule by mouth., Disp: , Rfl:     pantoprazole (ProtoNix) 40 mg EC tablet, Take 1 tablet (40 mg) by mouth once daily., Disp: 90 tablet, Rfl: 1    tadalafil (tadalafil, antihypertensive,) 20 mg tablet, Take 2 tablets (40 mg) by mouth once daily., Disp: , Rfl:      Results for orders placed or performed in visit on 05/10/24 (from the past 24 hour(s))   Basic Metabolic Panel   Result Value Ref Range    Glucose 95 74 - 99 mg/dL    Sodium 138 136 - 145 mmol/L    Potassium 4.0 3.5 - 5.3 mmol/L    Chloride 108 (H) 98 - 107 mmol/L    Bicarbonate 23 21 - 32 mmol/L    Anion Gap 11 10 - 20 mmol/L    Urea Nitrogen 17 6 - 23 mg/dL    Creatinine 0.65 0.50 - 1.05 mg/dL    eGFR >90 >60 mL/min/1.73m*2    Calcium 8.8 8.6 - 10.3 mg/dL   CBC   Result Value Ref Range    WBC 6.5 4.4 - 11.3 x10*3/uL    nRBC 0.0 0.0 - 0.0 /100 WBCs    RBC 4.37 4.00 - 5.20 x10*6/uL    Hemoglobin 12.9 12.0 - 16.0 g/dL    Hematocrit 38.8  36.0 - 46.0 %    MCV 89 80 - 100 fL    MCH 29.5 26.0 - 34.0 pg    MCHC 33.2 32.0 - 36.0 g/dL    RDW 14.0 11.5 - 14.5 %    Platelets 189 150 - 450 x10*3/uL               Assessment/Plan   Problem List Items Addressed This Visit       Scleroderma (Multi)    Relevant Orders    Basic Metabolic Panel (Completed)    CBC (Completed)    Pulmonary interstitial fibrosis (Multi)    Essential hypertension    Relevant Orders    Basic Metabolic Panel (Completed)    Gastroesophageal reflux disease without esophagitis    PAH (pulmonary arterial hypertension) with portal hypertension (Multi)     Other Visit Diagnoses       Pre-op evaluation    -  Primary    Relevant Orders    Basic Metabolic Panel (Completed)    CBC (Completed)    Hx of colonic polyps        Relevant Orders    Basic Metabolic Panel (Completed)    CBC (Completed)           Hx of colon polyps. Scheduled for Colonoscopy under MAC anesthesia per Dr. Rehman on 5/20/24.   Labs drawn; reviewed.   EKG from 2/2024 shows SR with PAC's   All surgery instructions reviewed with patient by RN. Verbalized understanding.        I spent 45 minutes in the professional and overall care of this patient.      Chasity Blankenship, CHAZ-CNS

## 2024-05-10 NOTE — PREPROCEDURE INSTRUCTIONS
Medication List            Accurate as of May 10, 2024 10:20 AM. Always use your most recent med list.                alendronate 70 mg tablet  Commonly known as: Fosamax  Take 1 tablet (70 mg) by mouth every 7 days. Take in the morning with a full glass of water, on an empty stomach, and do not take anything else by mouth or lie down for the next 30 min.     amLODIPine 10 mg tablet  Commonly known as: Norvasc  Take 1 tablet (10 mg) by mouth once daily.     aspirin 81 mg EC tablet     atorvastatin 40 mg tablet  Commonly known as: Lipitor  Take 1 tablet (40 mg) by mouth once daily.     FLUoxetine 10 mg capsule  Commonly known as: PROzac  Take 1 capsule (10 mg) by mouth once daily.     levothyroxine 137 mcg tablet  Commonly known as: Synthroid, Levoxyl  Take 1 tablet (137 mcg) by mouth once daily.     macitentan 10 mg tablet tablet  Commonly known as: Opsumit     omega-3 fatty acids-fish oil 340-1,000 mg capsule     pantoprazole 40 mg EC tablet  Commonly known as: ProtoNix  Take 1 tablet (40 mg) by mouth once daily.     tadalafil 20 mg tablet  Commonly known as: Cialis     Ventolin HFA 90 mcg/actuation inhaler  Generic drug: albuterol                              NPO Instructions:    Clear liquids with bowel prep on 5/19  Follow Dr. Rehman's instructions      Additional Instructions:     Wear  comfortable loose fitting clothing  Do not use moisturizers, creams, lotions or perfume  All jewelry and valuables should be left at home    Shower morning of deodorant only  Call with any questions [phone]

## 2024-05-15 ENCOUNTER — ANESTHESIA EVENT (OUTPATIENT)
Dept: OPERATING ROOM | Facility: HOSPITAL | Age: 69
End: 2024-05-15
Payer: MEDICARE

## 2024-05-20 ENCOUNTER — HOSPITAL ENCOUNTER (OUTPATIENT)
Dept: CARDIOLOGY | Facility: HOSPITAL | Age: 69
Discharge: HOME | End: 2024-05-20
Payer: MEDICARE

## 2024-05-20 ENCOUNTER — HOSPITAL ENCOUNTER (OUTPATIENT)
Dept: OPERATING ROOM | Facility: HOSPITAL | Age: 69
Discharge: HOME | End: 2024-05-20
Payer: MEDICARE

## 2024-05-20 ENCOUNTER — ANESTHESIA (OUTPATIENT)
Dept: OPERATING ROOM | Facility: HOSPITAL | Age: 69
End: 2024-05-20
Payer: MEDICARE

## 2024-05-20 VITALS
TEMPERATURE: 97.6 F | BODY MASS INDEX: 21.26 KG/M2 | WEIGHT: 120 LBS | SYSTOLIC BLOOD PRESSURE: 147 MMHG | RESPIRATION RATE: 15 BRPM | HEART RATE: 87 BPM | DIASTOLIC BLOOD PRESSURE: 82 MMHG | HEIGHT: 63 IN | OXYGEN SATURATION: 94 %

## 2024-05-20 DIAGNOSIS — D12.6 ADENOMATOUS POLYP OF COLON, UNSPECIFIED PART OF COLON: ICD-10-CM

## 2024-05-20 PROBLEM — I63.9 CVA (CEREBRAL VASCULAR ACCIDENT) (MULTI): Status: ACTIVE | Noted: 2024-05-20

## 2024-05-20 LAB
ATRIAL RATE: 63 BPM
P AXIS: 75 DEGREES
PR INTERVAL: 215 MS
Q ONSET: 252 MS
QRS COUNT: 11 BEATS
QRS DURATION: 96 MS
QT INTERVAL: 418 MS
QTC CALCULATION(BAZETT): 432 MS
QTC FREDERICIA: 427 MS
R AXIS: 15 DEGREES
T AXIS: 65 DEGREES
T OFFSET: 461 MS
VENTRICULAR RATE: 64 BPM

## 2024-05-20 PROCEDURE — 2500000004 HC RX 250 GENERAL PHARMACY W/ HCPCS (ALT 636 FOR OP/ED): Performed by: ANESTHESIOLOGY

## 2024-05-20 PROCEDURE — 7100000009 HC PHASE TWO TIME - INITIAL BASE CHARGE: Performed by: NURSE ANESTHETIST, CERTIFIED REGISTERED

## 2024-05-20 PROCEDURE — 93005 ELECTROCARDIOGRAM TRACING: CPT | Mod: 59

## 2024-05-20 PROCEDURE — 7100000002 HC RECOVERY ROOM TIME - EACH INCREMENTAL 1 MINUTE: Performed by: NURSE ANESTHETIST, CERTIFIED REGISTERED

## 2024-05-20 PROCEDURE — 3600000007 HC OR TIME - EACH INCREMENTAL 1 MINUTE - PROCEDURE LEVEL TWO: Performed by: NURSE ANESTHETIST, CERTIFIED REGISTERED

## 2024-05-20 PROCEDURE — 3700000001 HC GENERAL ANESTHESIA TIME - INITIAL BASE CHARGE: Performed by: NURSE ANESTHETIST, CERTIFIED REGISTERED

## 2024-05-20 PROCEDURE — 93010 ELECTROCARDIOGRAM REPORT: CPT | Performed by: INTERNAL MEDICINE

## 2024-05-20 PROCEDURE — 7100000001 HC RECOVERY ROOM TIME - INITIAL BASE CHARGE: Performed by: NURSE ANESTHETIST, CERTIFIED REGISTERED

## 2024-05-20 PROCEDURE — G0121 COLON CA SCRN NOT HI RSK IND: HCPCS | Performed by: INTERNAL MEDICINE

## 2024-05-20 PROCEDURE — 3700000002 HC GENERAL ANESTHESIA TIME - EACH INCREMENTAL 1 MINUTE: Performed by: NURSE ANESTHETIST, CERTIFIED REGISTERED

## 2024-05-20 PROCEDURE — 2500000004 HC RX 250 GENERAL PHARMACY W/ HCPCS (ALT 636 FOR OP/ED): Performed by: NURSE ANESTHETIST, CERTIFIED REGISTERED

## 2024-05-20 PROCEDURE — 7100000010 HC PHASE TWO TIME - EACH INCREMENTAL 1 MINUTE: Performed by: NURSE ANESTHETIST, CERTIFIED REGISTERED

## 2024-05-20 PROCEDURE — 2500000005 HC RX 250 GENERAL PHARMACY W/O HCPCS: Performed by: NURSE ANESTHETIST, CERTIFIED REGISTERED

## 2024-05-20 PROCEDURE — 3600000002 HC OR TIME - INITIAL BASE CHARGE - PROCEDURE LEVEL TWO: Performed by: NURSE ANESTHETIST, CERTIFIED REGISTERED

## 2024-05-20 RX ORDER — GLYCOPYRROLATE 0.2 MG/ML
INJECTION INTRAMUSCULAR; INTRAVENOUS AS NEEDED
Status: DISCONTINUED | OUTPATIENT
Start: 2024-05-20 | End: 2024-05-20

## 2024-05-20 RX ORDER — PROPOFOL 10 MG/ML
INJECTION, EMULSION INTRAVENOUS AS NEEDED
Status: DISCONTINUED | OUTPATIENT
Start: 2024-05-20 | End: 2024-05-20

## 2024-05-20 RX ORDER — FENTANYL CITRATE 50 UG/ML
INJECTION, SOLUTION INTRAMUSCULAR; INTRAVENOUS AS NEEDED
Status: DISCONTINUED | OUTPATIENT
Start: 2024-05-20 | End: 2024-05-20

## 2024-05-20 RX ORDER — ALBUTEROL SULFATE 0.83 MG/ML
2.5 SOLUTION RESPIRATORY (INHALATION) ONCE
Status: DISCONTINUED | OUTPATIENT
Start: 2024-05-20 | End: 2024-05-20 | Stop reason: HOSPADM

## 2024-05-20 RX ORDER — MULTIVIT-MIN/IRON FUM/FOLIC AC 7.5 MG-4
1 TABLET ORAL DAILY
COMMUNITY

## 2024-05-20 RX ORDER — FAMOTIDINE 10 MG/ML
20 INJECTION INTRAVENOUS ONCE
Status: COMPLETED | OUTPATIENT
Start: 2024-05-20 | End: 2024-05-20

## 2024-05-20 RX ORDER — SODIUM CHLORIDE, SODIUM LACTATE, POTASSIUM CHLORIDE, CALCIUM CHLORIDE 600; 310; 30; 20 MG/100ML; MG/100ML; MG/100ML; MG/100ML
50 INJECTION, SOLUTION INTRAVENOUS CONTINUOUS
Status: DISCONTINUED | OUTPATIENT
Start: 2024-05-20 | End: 2024-05-21 | Stop reason: HOSPADM

## 2024-05-20 RX ORDER — LIDOCAINE HCL/PF 100 MG/5ML
SYRINGE (ML) INTRAVENOUS AS NEEDED
Status: DISCONTINUED | OUTPATIENT
Start: 2024-05-20 | End: 2024-05-20

## 2024-05-20 RX ADMIN — FENTANYL CITRATE 50 MCG: 50 INJECTION INTRAMUSCULAR; INTRAVENOUS at 07:42

## 2024-05-20 RX ADMIN — PROPOFOL 40 MG: 10 INJECTION, EMULSION INTRAVENOUS at 07:46

## 2024-05-20 RX ADMIN — FAMOTIDINE 20 MG: 10 INJECTION, SOLUTION INTRAVENOUS at 07:01

## 2024-05-20 RX ADMIN — FENTANYL CITRATE 50 MCG: 50 INJECTION INTRAMUSCULAR; INTRAVENOUS at 07:47

## 2024-05-20 RX ADMIN — LIDOCAINE HYDROCHLORIDE 50 MG: 20 INJECTION, SOLUTION INTRAVENOUS at 07:42

## 2024-05-20 RX ADMIN — PROPOFOL 50 MG: 10 INJECTION, EMULSION INTRAVENOUS at 07:42

## 2024-05-20 RX ADMIN — GLYCOPYRROLATE 0.2 MG: 0.2 INJECTION INTRAMUSCULAR; INTRAVENOUS at 07:47

## 2024-05-20 RX ADMIN — SODIUM CHLORIDE, POTASSIUM CHLORIDE, SODIUM LACTATE AND CALCIUM CHLORIDE 50 ML/HR: 600; 310; 30; 20 INJECTION, SOLUTION INTRAVENOUS at 07:01

## 2024-05-20 SDOH — HEALTH STABILITY: MENTAL HEALTH: CURRENT SMOKER: 0

## 2024-05-20 ASSESSMENT — PAIN SCALES - GENERAL: PAIN_LEVEL: 0

## 2024-05-20 ASSESSMENT — COLUMBIA-SUICIDE SEVERITY RATING SCALE - C-SSRS
1. IN THE PAST MONTH, HAVE YOU WISHED YOU WERE DEAD OR WISHED YOU COULD GO TO SLEEP AND NOT WAKE UP?: NO
2. HAVE YOU ACTUALLY HAD ANY THOUGHTS OF KILLING YOURSELF?: NO
6. HAVE YOU EVER DONE ANYTHING, STARTED TO DO ANYTHING, OR PREPARED TO DO ANYTHING TO END YOUR LIFE?: NO

## 2024-05-20 NOTE — ANESTHESIA POSTPROCEDURE EVALUATION
Patient: Angelica Craven    Procedure Summary       Date: 05/20/24 Room / Location: [facility] OR    Anesthesia Start: 0730 Anesthesia Stop: 0804    Procedure: COLONOSCOPY Diagnosis: Adenomatous polyp of colon, unspecified part of colon    Scheduled Providers: Orville Rehman DO Responsible Provider: SUNNY Corey    Anesthesia Type: MAC ASA Status: 3            Anesthesia Type: MAC    Vitals Value Taken Time   /77 05/20/24 0811   Temp 36.4 °C (97.6 °F) 05/20/24 0803   Pulse 88 05/20/24 0814   Resp 10 05/20/24 0814   SpO2 97 % 05/20/24 0814   Vitals shown include unfiled device data.    Anesthesia Post Evaluation    Patient location during evaluation: PACU  Patient participation: complete - patient participated  Level of consciousness: awake and alert  Pain score: 0  Pain management: adequate  Airway patency: patent  Cardiovascular status: hemodynamically stable  Respiratory status: room air  Hydration status: acceptable  Postoperative Nausea and Vomiting: none    No notable events documented.

## 2024-05-20 NOTE — ANESTHESIA PREPROCEDURE EVALUATION
-- DO NOT REPLY / DO NOT REPLY ALL --  -- Message is from Engagement Center Operations (ECO) --    General Patient Message Madai is calling from [employer] [facility] and is asking for clarification on wheelchair or bariatric Rolator. Please call back to confirm this information so medical supply company can place order for patient. Please follow up on this.       Caller Information       Type Contact Phone/Fax    09/28/2022 09:28 AM CDT Phone (Incoming) Madai  (Other) [phone]     [facility]        Alternative phone number:     Can a detailed message be left? Yes    Message Turnaround:     Is it Working Hours? Yes - Working Hours     [place]:    Please give this turnaround time to the caller:   \"This message will be sent to [state Provider's name]. The clinical team will fulfill your request as soon as they review your message.\"                 Patient: Angelica Craven    Procedure Information       Date/Time: 05/20/24 0730    Scheduled providers: Orville Rehman DO    Procedure: COLONOSCOPY    Location: [facility] OR            Relevant Problems   Cardiac   (+) Essential hypertension      Pulmonary   (+) PAH (pulmonary arterial hypertension) with portal hypertension (Multi)   (+) Pulmonary artery hypertension associated with connective tissue disease (Multi)      Neuro   (+) CVA (cerebral vascular accident) (Multi)      GI   (+) Gastroesophageal reflux disease without esophagitis       Clinical information reviewed:   Tobacco  Allergies  Meds   Med Hx  Surg Hx   Fam Hx  Soc Hx        NPO Detail:  NPO/Void Status  Date of Last Liquid: 05/19/24  Time of Last Liquid: 2200  Date of Last Solid: 05/19/24  Time of Last Solid: 2200  Time of Last Void: 0636         Physical Exam    Airway  Mallampati: II  TM distance: >3 FB  Neck ROM: full     Cardiovascular - normal exam  Rhythm: regular  Rate: normal     Dental     Comments: Loose upper   Pulmonary - normal exam  Breath sounds clear to auscultation     Abdominal        Anesthesia Plan    History of general anesthesia?: yes  History of complications of general anesthesia?: no    ASA 3     MAC     The patient is not a current smoker.  Patient was previously instructed to abstain from smoking on day of procedure.  Patient did not smoke on day of procedure.    Anesthetic plan and risks discussed with patient.  Use of blood products discussed with patient who consented to blood products.    Plan discussed with CRNA.

## 2024-05-21 ASSESSMENT — PAIN SCALES - GENERAL: PAINLEVEL_OUTOF10: 0 - NO PAIN

## 2024-08-01 DIAGNOSIS — F33.42 RECURRENT MAJOR DEPRESSIVE DISORDER, IN FULL REMISSION (CMS-HCC): ICD-10-CM

## 2024-08-01 DIAGNOSIS — I10 ESSENTIAL HYPERTENSION: ICD-10-CM

## 2024-08-01 DIAGNOSIS — E03.9 HYPOTHYROIDISM, UNSPECIFIED TYPE: ICD-10-CM

## 2024-08-01 DIAGNOSIS — E78.2 MIXED HYPERLIPIDEMIA: ICD-10-CM

## 2024-08-01 DIAGNOSIS — K21.9 GASTROESOPHAGEAL REFLUX DISEASE WITHOUT ESOPHAGITIS: ICD-10-CM

## 2024-08-01 RX ORDER — FLUOXETINE 10 MG/1
10 CAPSULE ORAL DAILY
Qty: 90 CAPSULE | Refills: 1 | OUTPATIENT
Start: 2024-08-01

## 2024-08-01 RX ORDER — AMLODIPINE BESYLATE 10 MG/1
10 TABLET ORAL DAILY
Qty: 90 TABLET | Refills: 1 | OUTPATIENT
Start: 2024-08-01

## 2024-08-01 RX ORDER — ATORVASTATIN CALCIUM 40 MG/1
40 TABLET, FILM COATED ORAL DAILY
Qty: 90 TABLET | Refills: 1 | OUTPATIENT
Start: 2024-08-01

## 2024-08-01 RX ORDER — LEVOTHYROXINE SODIUM 137 UG/1
137 TABLET ORAL DAILY
Qty: 90 TABLET | Refills: 1 | OUTPATIENT
Start: 2024-08-01

## 2024-08-01 RX ORDER — PANTOPRAZOLE SODIUM 40 MG/1
40 TABLET, DELAYED RELEASE ORAL DAILY
Qty: 90 TABLET | Refills: 1 | OUTPATIENT
Start: 2024-08-01

## 2024-08-13 ENCOUNTER — LAB (OUTPATIENT)
Dept: LAB | Facility: LAB | Age: 69
End: 2024-08-13
Payer: MEDICARE

## 2024-08-13 DIAGNOSIS — M81.0 AGE-RELATED OSTEOPOROSIS WITHOUT CURRENT PATHOLOGICAL FRACTURE: ICD-10-CM

## 2024-08-13 DIAGNOSIS — E03.9 HYPOTHYROIDISM, UNSPECIFIED TYPE: ICD-10-CM

## 2024-08-13 DIAGNOSIS — Z00.00 ROUTINE GENERAL MEDICAL EXAMINATION AT A HEALTH CARE FACILITY: ICD-10-CM

## 2024-08-13 DIAGNOSIS — R73.01 IFG (IMPAIRED FASTING GLUCOSE): ICD-10-CM

## 2024-08-13 LAB
25(OH)D3 SERPL-MCNC: 37 NG/ML (ref 30–100)
ANION GAP SERPL CALC-SCNC: 15 MMOL/L (ref 10–20)
BUN SERPL-MCNC: 13 MG/DL (ref 6–23)
CALCIUM SERPL-MCNC: 9.2 MG/DL (ref 8.6–10.3)
CHLORIDE SERPL-SCNC: 108 MMOL/L (ref 98–107)
CHOLEST SERPL-MCNC: 121 MG/DL (ref 0–199)
CHOLESTEROL/HDL RATIO: 3.4
CO2 SERPL-SCNC: 20 MMOL/L (ref 21–32)
CREAT SERPL-MCNC: 0.66 MG/DL (ref 0.5–1.05)
EGFRCR SERPLBLD CKD-EPI 2021: >90 ML/MIN/1.73M*2
EST. AVERAGE GLUCOSE BLD GHB EST-MCNC: 126 MG/DL
GLUCOSE SERPL-MCNC: 87 MG/DL (ref 74–99)
HBA1C MFR BLD: 6 %
HDLC SERPL-MCNC: 35.6 MG/DL
LDLC SERPL CALC-MCNC: 58 MG/DL
NON HDL CHOLESTEROL: 85 MG/DL (ref 0–149)
POTASSIUM SERPL-SCNC: 4 MMOL/L (ref 3.5–5.3)
SODIUM SERPL-SCNC: 139 MMOL/L (ref 136–145)
T4 FREE SERPL-MCNC: 1.42 NG/DL (ref 0.61–1.12)
TRIGL SERPL-MCNC: 135 MG/DL (ref 0–149)
TSH SERPL-ACNC: 0.04 MIU/L (ref 0.44–3.98)
VLDL: 27 MG/DL (ref 0–40)

## 2024-08-13 PROCEDURE — 80048 BASIC METABOLIC PNL TOTAL CA: CPT

## 2024-08-13 PROCEDURE — 84439 ASSAY OF FREE THYROXINE: CPT

## 2024-08-13 PROCEDURE — 84443 ASSAY THYROID STIM HORMONE: CPT

## 2024-08-13 PROCEDURE — 36415 COLL VENOUS BLD VENIPUNCTURE: CPT

## 2024-08-13 PROCEDURE — 82306 VITAMIN D 25 HYDROXY: CPT

## 2024-08-13 PROCEDURE — 80061 LIPID PANEL: CPT

## 2024-08-13 PROCEDURE — 83036 HEMOGLOBIN GLYCOSYLATED A1C: CPT

## 2024-08-15 ENCOUNTER — APPOINTMENT (OUTPATIENT)
Dept: PRIMARY CARE | Facility: CLINIC | Age: 69
End: 2024-08-15
Payer: MEDICARE

## 2024-08-15 VITALS
WEIGHT: 122 LBS | TEMPERATURE: 98.6 F | SYSTOLIC BLOOD PRESSURE: 118 MMHG | BODY MASS INDEX: 21.61 KG/M2 | HEART RATE: 73 BPM | DIASTOLIC BLOOD PRESSURE: 72 MMHG

## 2024-08-15 DIAGNOSIS — K21.9 GASTROESOPHAGEAL REFLUX DISEASE WITHOUT ESOPHAGITIS: ICD-10-CM

## 2024-08-15 DIAGNOSIS — E03.9 HYPOTHYROIDISM, UNSPECIFIED TYPE: ICD-10-CM

## 2024-08-15 DIAGNOSIS — F33.42 RECURRENT MAJOR DEPRESSIVE DISORDER, IN FULL REMISSION (CMS-HCC): ICD-10-CM

## 2024-08-15 DIAGNOSIS — E78.2 MIXED HYPERLIPIDEMIA: ICD-10-CM

## 2024-08-15 DIAGNOSIS — R73.01 IFG (IMPAIRED FASTING GLUCOSE): ICD-10-CM

## 2024-08-15 DIAGNOSIS — I10 ESSENTIAL HYPERTENSION: ICD-10-CM

## 2024-08-15 DIAGNOSIS — Z01.818 PREOP EXAMINATION: Primary | ICD-10-CM

## 2024-08-15 DIAGNOSIS — I49.9 IRREGULAR HEART BEAT: ICD-10-CM

## 2024-08-15 DIAGNOSIS — H26.9: ICD-10-CM

## 2024-08-15 DIAGNOSIS — Z13.220 SCREENING CHOLESTEROL LEVEL: ICD-10-CM

## 2024-08-15 PROCEDURE — 1159F MED LIST DOCD IN RCRD: CPT | Performed by: FAMILY MEDICINE

## 2024-08-15 PROCEDURE — 99214 OFFICE O/P EST MOD 30 MIN: CPT | Performed by: FAMILY MEDICINE

## 2024-08-15 PROCEDURE — 1036F TOBACCO NON-USER: CPT | Performed by: FAMILY MEDICINE

## 2024-08-15 PROCEDURE — 3078F DIAST BP <80 MM HG: CPT | Performed by: FAMILY MEDICINE

## 2024-08-15 PROCEDURE — 93000 ELECTROCARDIOGRAM COMPLETE: CPT | Performed by: FAMILY MEDICINE

## 2024-08-15 PROCEDURE — 3074F SYST BP LT 130 MM HG: CPT | Performed by: FAMILY MEDICINE

## 2024-08-15 PROCEDURE — 1160F RVW MEDS BY RX/DR IN RCRD: CPT | Performed by: FAMILY MEDICINE

## 2024-08-15 RX ORDER — SODIUM CHLORIDE 50 MG/G
1 OINTMENT OPHTHALMIC NIGHTLY
COMMUNITY
Start: 2024-07-26

## 2024-08-15 RX ORDER — LEVOTHYROXINE SODIUM 125 UG/1
125 TABLET ORAL DAILY
Qty: 90 TABLET | Refills: 3 | Status: SHIPPED | OUTPATIENT
Start: 2024-08-15 | End: 2025-08-15

## 2024-08-15 RX ORDER — FLUOXETINE 10 MG/1
10 CAPSULE ORAL DAILY
Qty: 90 CAPSULE | Refills: 1 | Status: SHIPPED | OUTPATIENT
Start: 2024-08-15 | End: 2025-02-11

## 2024-08-15 RX ORDER — AMLODIPINE BESYLATE 10 MG/1
10 TABLET ORAL DAILY
Qty: 90 TABLET | Refills: 1 | Status: SHIPPED | OUTPATIENT
Start: 2024-08-15 | End: 2025-02-11

## 2024-08-15 RX ORDER — PANTOPRAZOLE SODIUM 40 MG/1
40 TABLET, DELAYED RELEASE ORAL
Qty: 90 TABLET | Refills: 1 | Status: SHIPPED | OUTPATIENT
Start: 2024-08-15 | End: 2025-02-11

## 2024-08-15 RX ORDER — ATORVASTATIN CALCIUM 40 MG/1
40 TABLET, FILM COATED ORAL
Qty: 90 TABLET | Refills: 1 | Status: SHIPPED | OUTPATIENT
Start: 2024-08-15 | End: 2025-02-11

## 2024-08-15 NOTE — PROGRESS NOTES
Assessment/Plan    ASSESSMENT/PLAN:      Patient Instructions   Pt has a 6.0 % risk for cardiac event with procedure. She is cleared from PCP standpoint for procedure   EKG in the office NSR, no arrhythmia or ST changes, reviewed previus TTE with EF of 58%   Decreased levo-T to 125mcg, recheck TSH in 6 weeks   Monitor diet decrease A1c, increase physical activity       FUTURE DIRECTION:   []    Subjective   SUBJECTIVE:     Reason for Visit: Angelica Craven is an 69 y.o. female here for a Medicare Wellness visit.      Pulmonary arterial hypertension  Secondary to connective tissue disease  Following cardiology     Scleroderma  With systemic symptoms like dysphagia, microstomia    has not been following with dermatology      IgG lambda MGUS  Following hematology oncology yearly   No evidence of end organ damage     Agnosia    ongoing for many years      History of CVA   has residual weakness in LE R>L and has slurred speech      HTN   amlodipine 10mg      HLD   lipitor 40mg daily      Depression    fuloxetine 10mg      GERD   pantoprazole      Hypothyroidism   Levothyroxine 137mcg       Care Team   Cardiology: Ramona vazquez  Pulmonology: Soledad Yap     Revised Cardiac Risk Index for PreOperative Risk  Class II risk:  6.0 %  30day risk of death, MI, or cardiac arrest    From [employer]andre 2017. These numbers are higher than those from the original study (Skip 1999). See Evidence for details    Past Medical, Surgical, and Family History reviewed and updated in chart.    Reviewed all medications by prescribing practitioner or clinical pharmacist (such as prescriptions, OTCs, herbal therapies and supplements) and documented in the medical record.    Patient Care Team:  Nickie Velásquez DO as PCP - General (Family Medicine)  Nickie Velásquez DO as PCP - [employer] PCP     Review of Systems    Objective   OBJECTIVE:     Vitals:  /72   Pulse 73   Temp 37 °C (98.6 °F)   Wt 55.3 kg (122 lb)    BMI 21.61 kg/m²       Physical Exam  HENT:      Head: Normocephalic and atraumatic.      Nose: Nose normal.      Mouth/Throat:      Mouth: Mucous membranes are moist.   Eyes:      Pupils: Pupils are equal, round, and reactive to light.   Cardiovascular:      Rate and Rhythm: Normal rate. Rhythm irregular.      Pulses: Normal pulses.      Heart sounds: No murmur heard.  Pulmonary:      Effort: Pulmonary effort is normal.      Breath sounds: Normal breath sounds.   Abdominal:      Tenderness: There is no abdominal tenderness.   Musculoskeletal:         General: Normal range of motion.      Cervical back: Normal range of motion.   Skin:     General: Skin is warm and dry.   Neurological:      Mental Status: She is alert.   Psychiatric:         Mood and Affect: Mood normal.

## 2024-08-15 NOTE — PATIENT INSTRUCTIONS
Pt has a 6.0 % risk for cardiac event with procedure. She is cleared from PCP standpoint for procedure   EKG in the office NSR, no arrhythmia or ST changes, reviewed previus TTE with EF of 58%   Decreased levo-T to 125mcg, recheck TSH in 6 weeks   Monitor diet decrease A1c, increase physical activity

## 2024-09-27 ENCOUNTER — LAB (OUTPATIENT)
Dept: LAB | Facility: LAB | Age: 69
End: 2024-09-27
Payer: MEDICARE

## 2024-09-27 DIAGNOSIS — E03.9 HYPOTHYROIDISM, UNSPECIFIED TYPE: Primary | ICD-10-CM

## 2024-09-27 DIAGNOSIS — E03.9 HYPOTHYROIDISM, UNSPECIFIED TYPE: ICD-10-CM

## 2024-09-27 LAB — TSH SERPL-ACNC: 0.06 MIU/L (ref 0.44–3.98)

## 2024-09-27 PROCEDURE — 84443 ASSAY THYROID STIM HORMONE: CPT

## 2024-09-27 PROCEDURE — 36415 COLL VENOUS BLD VENIPUNCTURE: CPT

## 2024-09-27 RX ORDER — LEVOTHYROXINE SODIUM 112 UG/1
112 TABLET ORAL DAILY
Qty: 90 TABLET | Refills: 0 | Status: SHIPPED | OUTPATIENT
Start: 2024-09-27

## 2024-12-02 ENCOUNTER — LAB (OUTPATIENT)
Dept: LAB | Facility: LAB | Age: 69
End: 2024-12-02
Payer: MEDICARE

## 2024-12-02 DIAGNOSIS — E03.9 HYPOTHYROIDISM, UNSPECIFIED TYPE: ICD-10-CM

## 2024-12-02 LAB — TSH SERPL-ACNC: 0.79 MIU/L (ref 0.44–3.98)

## 2024-12-02 PROCEDURE — 36415 COLL VENOUS BLD VENIPUNCTURE: CPT

## 2024-12-02 PROCEDURE — 84443 ASSAY THYROID STIM HORMONE: CPT

## 2024-12-27 DIAGNOSIS — E03.9 HYPOTHYROIDISM, UNSPECIFIED TYPE: ICD-10-CM

## 2024-12-29 RX ORDER — LEVOTHYROXINE SODIUM 112 UG/1
TABLET ORAL
Qty: 90 TABLET | Refills: 0 | OUTPATIENT
Start: 2024-12-29

## 2024-12-30 DIAGNOSIS — E03.9 HYPOTHYROIDISM, UNSPECIFIED TYPE: ICD-10-CM

## 2024-12-30 RX ORDER — LEVOTHYROXINE SODIUM 112 UG/1
112 TABLET ORAL DAILY
Qty: 90 TABLET | Refills: 0 | Status: SHIPPED | OUTPATIENT
Start: 2024-12-30

## 2025-01-14 ENCOUNTER — TELEPHONE (OUTPATIENT)
Dept: PRIMARY CARE | Facility: CLINIC | Age: 70
End: 2025-01-14
Payer: MEDICARE

## 2025-02-03 ENCOUNTER — TELEPHONE (OUTPATIENT)
Dept: PRIMARY CARE | Facility: CLINIC | Age: 70
End: 2025-02-03
Payer: MEDICARE

## 2025-02-03 DIAGNOSIS — K21.9 GASTROESOPHAGEAL REFLUX DISEASE WITHOUT ESOPHAGITIS: ICD-10-CM

## 2025-02-03 DIAGNOSIS — I10 ESSENTIAL HYPERTENSION: ICD-10-CM

## 2025-02-03 DIAGNOSIS — M81.0 AGE-RELATED OSTEOPOROSIS WITHOUT CURRENT PATHOLOGICAL FRACTURE: ICD-10-CM

## 2025-02-03 DIAGNOSIS — F33.42 RECURRENT MAJOR DEPRESSIVE DISORDER, IN FULL REMISSION (CMS-HCC): ICD-10-CM

## 2025-02-03 DIAGNOSIS — E78.2 MIXED HYPERLIPIDEMIA: ICD-10-CM

## 2025-02-03 RX ORDER — AMLODIPINE BESYLATE 10 MG/1
10 TABLET ORAL DAILY
Qty: 90 TABLET | Refills: 0 | Status: SHIPPED | OUTPATIENT
Start: 2025-02-03 | End: 2025-08-02

## 2025-02-03 RX ORDER — PANTOPRAZOLE SODIUM 40 MG/1
40 TABLET, DELAYED RELEASE ORAL
Qty: 90 TABLET | Refills: 0 | Status: SHIPPED | OUTPATIENT
Start: 2025-02-03 | End: 2025-08-02

## 2025-02-03 RX ORDER — ALENDRONATE SODIUM 70 MG/1
70 TABLET ORAL
Qty: 12 TABLET | Refills: 0 | Status: SHIPPED | OUTPATIENT
Start: 2025-02-03 | End: 2025-05-04

## 2025-02-03 RX ORDER — ATORVASTATIN CALCIUM 40 MG/1
40 TABLET, FILM COATED ORAL
Qty: 90 TABLET | Refills: 0 | Status: SHIPPED | OUTPATIENT
Start: 2025-02-03 | End: 2025-08-02

## 2025-02-03 RX ORDER — FLUOXETINE 10 MG/1
10 CAPSULE ORAL DAILY
Qty: 90 CAPSULE | Refills: 0 | Status: SHIPPED | OUTPATIENT
Start: 2025-02-03 | End: 2025-08-02

## 2025-02-03 NOTE — TELEPHONE ENCOUNTER
Pt scheduled for AWE with Dr. Rivas on 4/1.  Requesting refills on:  Alendronate--out of meds  Atorvastatin  Fluoxetine  Pantoprazole  Amlodopine  Requesting refills to [facility].  Pt did not want short supply to local pharm

## 2025-02-26 ASSESSMENT — RHEUMATOLOGY NEW PATIENT QUESTIONNAIRE
FAINTING: N
BLACK STOOLS: N
AGITATION: N
MEMORY LOSS: Y
INCREASED SUSCEPTIBILITY TO INFECTION: Y
DRYNESS OF MOUTH: Y
UNEXPLAINED HEARING LOSS: Y
SEIZURES: N
SWOLLEN LEGS OR FEET: Y
BLOOD IN STOOLS: N
RASH: N
STOMACH PAIN: N
EASILY LOSING TEMPER: N
ANEMIA: N
NAUSEA: N
EYE DRYNESS: Y
SORES IN MOUTH OR NOSE: N
SKIN TIGHTNESS: Y
LOSS OF CONSCIOUSNESS: N
JAUNDICE: N
NODULES/BUMPS: N
SHORTNESS OF BREATH: Y
DIFFICULTY SWALLOWING: Y
VAGINAL DRYNESS: N
BEHAVIORAL CHANGES: N
EXCESSIVE HAIR LOSS (MORE THAN YOUR NORM): Y
LOSS OF VISION: N
EYE PAIN: N
DIFFICULTY BREATHING LYING DOWN: N
MORNING STIFFNESS IN LOWER BACK: N
MORNING STIFFNESS: Y
HOW WOULD YOU DESCRIBE YOUR STIFFNESS ON AVERAGE: MODERATE
VOMITING OF BLOOD OR COFFEE GROUND CONSISTENCY MATERIAL: N
DEPRESSION: N
COUGHING OF BLOOD: N
FEVER: N
EYE REDNESS: N
MUSCLE WEAKNESS: Y
SUN SENSITIVE (SUN ALLERGY): N
UNEXPLAINED WEIGHT CHANGE: Y
SWOLLEN OR TENDER GLANDS: N
JOINT PAIN: Y
ANXIETY: Y
DOUBLE OR BLURRED VISION: N
RASH OR ULCERS: N
EASY BRUISING: Y
HEADACHES: Y
WHEEZING: Y
COUGH: Y
PAIN OR BURNING ON URINATION: N
NIGHT SWEATS: N
ABNORMAL URINE: Y
DIFFICULTY FALLING ASLEEP: N
HEARTBURN OR REFLUX: Y
HOARSE VOICE: N
JOINT SWELLING: N
UNUSUAL FATIGUE: Y
SKIN REDNESS: N
COLOR CHANGES OF HANDS OR FEET IN THE COLD: Y
NUMBNESS OR TINGLING IN HANDS OR FEET: Y
PERSISTENT DIARRHEA: N
DIFFICULTY STAYING ASLEEP: Y
CHEST PAIN: N
UNUSUAL BLEEDING: N

## 2025-02-28 ENCOUNTER — APPOINTMENT (OUTPATIENT)
Dept: RHEUMATOLOGY | Facility: CLINIC | Age: 70
End: 2025-02-28
Payer: MEDICARE

## 2025-02-28 VITALS
SYSTOLIC BLOOD PRESSURE: 112 MMHG | DIASTOLIC BLOOD PRESSURE: 72 MMHG | BODY MASS INDEX: 20.55 KG/M2 | HEIGHT: 63 IN | WEIGHT: 116 LBS

## 2025-02-28 DIAGNOSIS — M34.1 CREST (CALCINOSIS, RAYNAUD'S PHENOMENON, ESOPHAGEAL DYSFUNCTION, SCLERODACTYLY, TELANGIECTASIA) (MULTI): ICD-10-CM

## 2025-02-28 DIAGNOSIS — I27.21 PULMONARY ARTERY HYPERTENSION ASSOCIATED WITH CONNECTIVE TISSUE DISEASE (MULTI): ICD-10-CM

## 2025-02-28 DIAGNOSIS — M35.9 PULMONARY ARTERY HYPERTENSION ASSOCIATED WITH CONNECTIVE TISSUE DISEASE (MULTI): ICD-10-CM

## 2025-02-28 DIAGNOSIS — M34.9 SCLERODERMA (MULTI): Primary | ICD-10-CM

## 2025-02-28 PROCEDURE — 3074F SYST BP LT 130 MM HG: CPT | Performed by: INTERNAL MEDICINE

## 2025-02-28 PROCEDURE — 3078F DIAST BP <80 MM HG: CPT | Performed by: INTERNAL MEDICINE

## 2025-02-28 PROCEDURE — 1159F MED LIST DOCD IN RCRD: CPT | Performed by: INTERNAL MEDICINE

## 2025-02-28 PROCEDURE — 99204 OFFICE O/P NEW MOD 45 MIN: CPT | Performed by: INTERNAL MEDICINE

## 2025-02-28 PROCEDURE — 3008F BODY MASS INDEX DOCD: CPT | Performed by: INTERNAL MEDICINE

## 2025-02-28 PROCEDURE — 1126F AMNT PAIN NOTED NONE PRSNT: CPT | Performed by: INTERNAL MEDICINE

## 2025-02-28 PROCEDURE — 1036F TOBACCO NON-USER: CPT | Performed by: INTERNAL MEDICINE

## 2025-02-28 ASSESSMENT — PAIN SCALES - GENERAL: PAINLEVEL_OUTOF10: 0-NO PAIN

## 2025-02-28 NOTE — PROGRESS NOTES
Subjective . Angelica Craven is a 69 y.o. female who presents for Establish Care (Np-new to area /scleroderma).    HPI.  69-year-old female with history of limited scleroderma/CREST,(positive NIRMALA, anticentromere) PAH, osteoporosis, MGUS, hypothyroidism, HTN, GERD, cerebellar ataxia andCVA referred to establish the care.    She stated that she was diagnosed with scleroderma when she was 37-year-old.  She initially was following rheumatology at [facility] however she moved to [facility] and established care with rheumatology and cardiology.  She moved back to [place] in 2023 and saw a rheumatologist at [facility] in August 2024 however was not satisfied.  However she follows with pulmonary medicine at [facility].  She was found to have abnormal SPEP and was evaluated through oncology at [facility].  She was told that she has MGUS.    He reports shortness of breath that gets worse with exertion at times.  She uses oxygen at night.    She reports Raynaud's that gets worse in winter months.  She states she has no more ulcers and calcification at the fingertips.    She notes pain in her toes and feels they are swollen.  She feels better with putting on the socks and using Vicks.      She feels some difficulty of swallowing however it is not as bad as it was in the past.  She has had esophageal dilatation 3-4 times in the past when she was in the [place].  Last dilatation was done about 4 years ago.    She reports dryness of the mouth.  Anti-SSA and anti-SSB negative.  She uses Biotene products and plenty of liquids.    She has chronic pain and stiffness of the hips and knees.  She reports hip and knee pain upon getting up and walking.  She has not noticed swelling of the knees.    She reports no recent fall or fractures.  She is on alendronate for the past 1 year.  DEXA scan obtained in January 2024 showed left femoral neck T-score of -3.0 and L1-L4 T-score of -0.1.    Social history: She is   "and lives alone.  She denies alcohol use or smoking.  Family history: Both parents has had heart disease.  No family history of scleroderma or other autoimmune connective tissue disease.   Surgical history: Left cataract surgery.    Review of Systems   All other systems reviewed and are negative.    Objective     Blood pressure 112/72, height 1.6 m (5' 3\"), weight 52.6 kg (116 lb).    Physical Exam.  Gen. AAO x3, NAD.  HEENT: No pallor or icterus, PERRLA, EOMI. Oropharynx is clear. MM dry.  Parotid glands  not enlarged. No cervical lymphadenopathy .  Skin: Skin thickening up to the MCP joints of both hands.  Old superficial small ulceration at the fingertips.  Telangiectasias involving the hands and face.  Heart: S1, S2/ RRR.   Lungs: CTA B.  Abdomen: Soft, NT/ND, BS regular.  MSK: No.swelling or tenderness of the  upper or lower extremity joints with full ROM, Neck,spine and Arsalan SI with out tenderness.  Neuro: CN II-XII intact. Sensation to touch intact.Strength 5/5 throughout. DTR 2+ and symmetrical.  Psych:Appropriate mood and behavior  EXT: No edema    Assessment/Plan .     #1: Limited scleroderma/crest/PAH.  -She is to continue tadalafil and macitentan.  She is following with pulmonary medicine at [facility].  -Raynaud's is not too bad.  She is also on amlodipine 10 mg daily.  Patient was advised to stay warm and minimize cold exposure.  -We believe she has esophageal strictures.  So far she is doing well.  She is on pantoprazole.  Gastroenterology referral discussed.  -Continue Biotene products and plenty of liquids.  She stated that she could not tolerate a medication (hot flashes) given several years ago.  -Knee and hip pain appears muscular.  Stretching exercises discussed.      #2: Osteoporosis.  She has no history of fragility fractures.  No recent fall.    -She is on Fosamax alendronate per PCP.  She is tolerating it well.    Follow-up in 3 months.     This note was partially generated using the Dragon Voice " recognition system. There may be some incorrect wording, spelling and/or spelling errors or punctuation errors that were not corrected prior to committing the note to the medical record.    Problem List Items Addressed This Visit    None           Active Ambulatory Problems     Diagnosis Date Noted    Scleroderma (Multi) 12/28/2023    Pulmonary artery hypertension associated with connective tissue disease (Multi) 12/28/2023    Pulmonary interstitial fibrosis (Multi) 12/28/2023    Cerebellar ataxia (Multi) 12/28/2023    Essential hypertension 01/15/2016    Gastroesophageal reflux disease without esophagitis 01/15/2016    PAH (pulmonary arterial hypertension) with portal hypertension (Multi) 02/15/2024    CVA (cerebral vascular accident) (Multi) 05/20/2024     Resolved Ambulatory Problems     Diagnosis Date Noted    No Resolved Ambulatory Problems     Past Medical History:   Diagnosis Date    CHF (congestive heart failure)     Dental disease     Hearing aid worn     HL (hearing loss)     Hyperlipidemia     Pulmonary arterial hypertension (Multi)     Pulmonary fibrosis (Multi)     Shortness of breath     Stroke (Multi)     Vision loss        Family History   Problem Relation Name Age of Onset    Coronary artery disease Mother          CABG 3 V    Other (pacemaker) Father      Diabetes Sister         Past Surgical History:   Procedure Laterality Date    BREAST BIOPSY         Social History     Tobacco Use   Smoking Status Never   Smokeless Tobacco Never       Allergies  Patient has no known allergies.    Current Meds  Current Outpatient Medications   Medication Instructions    albuterol (Ventolin HFA) 90 mcg/actuation inhaler 2 puffs, Every 6 hours PRN    alendronate (FOSAMAX) 70 mg, oral, Every 7 days, Take in the morning with a full glass of water, on an empty stomach, and do not take anything else by mouth or lie down for the next 30 min.    amLODIPine (NORVASC) 10 mg, oral, Daily    aspirin 81 mg, Daily RT     atorvastatin (LIPITOR) 40 mg, oral, Daily RT    FLUoxetine (PROZAC) 10 mg, oral, Daily    levothyroxine (SYNTHROID, LEVOXYL) 112 mcg, oral, Daily, Take on an empty stomach at the same time each day, either 30 to 60 minutes prior to breakfast    macitentan (OPSUMIT) 10 mg, Daily RT    multivitamin with minerals tablet 1 tablet, Daily    Kameron 128 5 % ophthalmic ointment 1 Application, Nightly    omega-3 fatty acids-fish oil 340-1,000 mg capsule 1 capsule    pantoprazole (PROTONIX) 40 mg, oral, Daily RT    tadalafil (CIALIS) 40 mg, Daily RT        Dayron Davila MD

## 2025-03-07 ENCOUNTER — HOSPITAL ENCOUNTER (OUTPATIENT)
Dept: RADIOLOGY | Facility: HOSPITAL | Age: 70
Discharge: HOME | End: 2025-03-07
Payer: MEDICARE

## 2025-03-07 VITALS — WEIGHT: 116 LBS | BODY MASS INDEX: 20.55 KG/M2 | HEIGHT: 63 IN

## 2025-03-07 DIAGNOSIS — Z12.31 SCREENING MAMMOGRAM FOR BREAST CANCER: ICD-10-CM

## 2025-03-07 PROCEDURE — 77067 SCR MAMMO BI INCL CAD: CPT

## 2025-03-07 PROCEDURE — 77067 SCR MAMMO BI INCL CAD: CPT | Performed by: RADIOLOGY

## 2025-03-07 PROCEDURE — 77063 BREAST TOMOSYNTHESIS BI: CPT | Performed by: RADIOLOGY

## 2025-03-15 DIAGNOSIS — E03.9 HYPOTHYROIDISM, UNSPECIFIED TYPE: ICD-10-CM

## 2025-03-17 RX ORDER — LEVOTHYROXINE SODIUM 112 UG/1
TABLET ORAL
Qty: 90 TABLET | Refills: 0 | Status: SHIPPED | OUTPATIENT
Start: 2025-03-17

## 2025-04-01 ENCOUNTER — APPOINTMENT (OUTPATIENT)
Dept: PRIMARY CARE | Facility: CLINIC | Age: 70
End: 2025-04-01
Payer: MEDICARE

## 2025-04-01 VITALS
SYSTOLIC BLOOD PRESSURE: 120 MMHG | HEART RATE: 72 BPM | WEIGHT: 121 LBS | DIASTOLIC BLOOD PRESSURE: 62 MMHG | BODY MASS INDEX: 21.44 KG/M2 | HEIGHT: 63 IN | TEMPERATURE: 97.8 F

## 2025-04-01 DIAGNOSIS — E78.2 MIXED HYPERLIPIDEMIA: ICD-10-CM

## 2025-04-01 DIAGNOSIS — M35.9 PULMONARY ARTERY HYPERTENSION ASSOCIATED WITH CONNECTIVE TISSUE DISEASE: ICD-10-CM

## 2025-04-01 DIAGNOSIS — I27.21 PAH (PULMONARY ARTERIAL HYPERTENSION) WITH PORTAL HYPERTENSION (MULTI): ICD-10-CM

## 2025-04-01 DIAGNOSIS — K76.6 PAH (PULMONARY ARTERIAL HYPERTENSION) WITH PORTAL HYPERTENSION (MULTI): ICD-10-CM

## 2025-04-01 DIAGNOSIS — J84.10 PULMONARY INTERSTITIAL FIBROSIS (MULTI): ICD-10-CM

## 2025-04-01 DIAGNOSIS — E03.9 HYPOTHYROIDISM, UNSPECIFIED TYPE: ICD-10-CM

## 2025-04-01 DIAGNOSIS — I27.21 PULMONARY ARTERY HYPERTENSION ASSOCIATED WITH CONNECTIVE TISSUE DISEASE: ICD-10-CM

## 2025-04-01 DIAGNOSIS — M81.0 AGE-RELATED OSTEOPOROSIS WITHOUT CURRENT PATHOLOGICAL FRACTURE: ICD-10-CM

## 2025-04-01 DIAGNOSIS — I10 ESSENTIAL HYPERTENSION: ICD-10-CM

## 2025-04-01 DIAGNOSIS — Z00.00 ROUTINE GENERAL MEDICAL EXAMINATION AT HEALTH CARE FACILITY: Primary | ICD-10-CM

## 2025-04-01 DIAGNOSIS — G11.9 CEREBELLAR ATAXIA (MULTI): ICD-10-CM

## 2025-04-01 DIAGNOSIS — R13.19 ESOPHAGEAL DYSPHAGIA: ICD-10-CM

## 2025-04-01 DIAGNOSIS — K21.9 GASTROESOPHAGEAL REFLUX DISEASE WITHOUT ESOPHAGITIS: ICD-10-CM

## 2025-04-01 DIAGNOSIS — F33.42 RECURRENT MAJOR DEPRESSIVE DISORDER, IN FULL REMISSION: ICD-10-CM

## 2025-04-01 DIAGNOSIS — R73.03 BORDERLINE DIABETES: ICD-10-CM

## 2025-04-01 PROBLEM — C34.90 MALIGNANT NEOPLASM OF UNSPECIFIED PART OF UNSPECIFIED BRONCHUS OR LUNG (MULTI): Status: ACTIVE | Noted: 2025-04-01

## 2025-04-01 PROCEDURE — 1159F MED LIST DOCD IN RCRD: CPT | Performed by: FAMILY MEDICINE

## 2025-04-01 PROCEDURE — 3078F DIAST BP <80 MM HG: CPT | Performed by: FAMILY MEDICINE

## 2025-04-01 PROCEDURE — 99397 PER PM REEVAL EST PAT 65+ YR: CPT | Performed by: FAMILY MEDICINE

## 2025-04-01 PROCEDURE — 99214 OFFICE O/P EST MOD 30 MIN: CPT | Performed by: FAMILY MEDICINE

## 2025-04-01 PROCEDURE — 1036F TOBACCO NON-USER: CPT | Performed by: FAMILY MEDICINE

## 2025-04-01 PROCEDURE — 3008F BODY MASS INDEX DOCD: CPT | Performed by: FAMILY MEDICINE

## 2025-04-01 PROCEDURE — 1170F FXNL STATUS ASSESSED: CPT | Performed by: FAMILY MEDICINE

## 2025-04-01 PROCEDURE — 3074F SYST BP LT 130 MM HG: CPT | Performed by: FAMILY MEDICINE

## 2025-04-01 PROCEDURE — G0439 PPPS, SUBSEQ VISIT: HCPCS | Performed by: FAMILY MEDICINE

## 2025-04-01 RX ORDER — PANTOPRAZOLE SODIUM 40 MG/1
40 TABLET, DELAYED RELEASE ORAL
Qty: 90 TABLET | Refills: 1 | Status: SHIPPED | OUTPATIENT
Start: 2025-04-01 | End: 2025-09-28

## 2025-04-01 RX ORDER — ALENDRONATE SODIUM 70 MG/1
70 TABLET ORAL
Qty: 12 TABLET | Refills: 1 | Status: SHIPPED | OUTPATIENT
Start: 2025-04-01 | End: 2025-09-28

## 2025-04-01 RX ORDER — LEVOTHYROXINE SODIUM 112 UG/1
112 TABLET ORAL DAILY
Qty: 90 TABLET | Refills: 1 | Status: SHIPPED | OUTPATIENT
Start: 2025-04-01 | End: 2025-09-28

## 2025-04-01 RX ORDER — FLUOXETINE 10 MG/1
10 CAPSULE ORAL DAILY
Qty: 90 CAPSULE | Refills: 1 | Status: SHIPPED | OUTPATIENT
Start: 2025-04-01 | End: 2025-09-28

## 2025-04-01 RX ORDER — AMLODIPINE BESYLATE 10 MG/1
10 TABLET ORAL DAILY
Qty: 90 TABLET | Refills: 1 | Status: SHIPPED | OUTPATIENT
Start: 2025-04-01 | End: 2025-09-28

## 2025-04-01 RX ORDER — ATORVASTATIN CALCIUM 40 MG/1
40 TABLET, FILM COATED ORAL
Qty: 90 TABLET | Refills: 1 | Status: SHIPPED | OUTPATIENT
Start: 2025-04-01 | End: 2025-09-28

## 2025-04-01 ASSESSMENT — ACTIVITIES OF DAILY LIVING (ADL)
DOING_HOUSEWORK: INDEPENDENT
GROCERY_SHOPPING: INDEPENDENT
BATHING: INDEPENDENT
DRESSING: INDEPENDENT
TAKING_MEDICATION: INDEPENDENT
MANAGING_FINANCES: INDEPENDENT

## 2025-04-01 ASSESSMENT — ENCOUNTER SYMPTOMS
APPETITE CHANGE: 0
ABDOMINAL PAIN: 0
DIZZINESS: 0
FEVER: 0
SHORTNESS OF BREATH: 0
CHILLS: 0
LIGHT-HEADEDNESS: 0
ACTIVITY CHANGE: 0
SLEEP DISTURBANCE: 0

## 2025-04-01 ASSESSMENT — PATIENT HEALTH QUESTIONNAIRE - PHQ9
1. LITTLE INTEREST OR PLEASURE IN DOING THINGS: NOT AT ALL
2. FEELING DOWN, DEPRESSED OR HOPELESS: NOT AT ALL
SUM OF ALL RESPONSES TO PHQ9 QUESTIONS 1 AND 2: 0

## 2025-04-01 NOTE — PATIENT INSTRUCTIONS
Pls consider the following vaccines:   We recommend that everyone over the age of 50 get vaccination against shingles called Shingrix.  It is more effective than the original shingles vaccination.  We also recommend getting the rsv vaccine

## 2025-04-01 NOTE — ASSESSMENT & PLAN NOTE
Controlled. No changes to blood pressure meds. Recommended to check her bp at home once a week. Goal less than 130/80    Orders:    amLODIPine (Norvasc) 10 mg tablet; Take 1 tablet (10 mg) by mouth once daily.

## 2025-04-01 NOTE — ASSESSMENT & PLAN NOTE
Orders:    Referral to Gastroenterology; Future    pantoprazole (ProtoNix) 40 mg EC tablet; Take 1 tablet (40 mg) by mouth once daily.

## 2025-04-01 NOTE — PROGRESS NOTES
Subjective   Reason for Visit: Angelica Craven is an 69 y.o. female here for a Medicare Wellness visit.     Past Medical, Surgical, and Family History reviewed and updated in chart.    Reviewed all medications by prescribing practitioner or clinical pharmacist (such as prescriptions, OTCs, herbal therapies and supplements) and documented in the medical record.    HPI  Pulmonary arterial hypertension  Secondary to connective tissue disease  Sees pulm   Has SOB at baseline   Has scleroderma with fibrosis      Scleroderma/CREST   With systemic symptoms like dysphagia, microstomia   Sees rheumatology   continue tadalafil and macitentan      IgG lambda MGUS  Following hematology oncology yearly   No evidence of end organ damage      Agnosia    ongoing for many years      History of CVA  has residual weakness in LE R>L and has slurred speech  March 2021       HTN   amlodipine 10mg      HLD   lipitor 40mg daily   No side effects      Depression    fuloxetine 10mg   Doing well with this med      GERD   pantoprazole and having dysphagia  Had her esophagus dilated several yrs back for stricture   Has been on ppi for over 10 yrs     Hypothyroidism   Levothyroxine was decreased to 112 mcg from 125 mcg last visit   Lab Results   Component Value Date    TSH 0.79 12/02/2024     Osteoporosis  Last dexa 12/2023   On fosamax   No side effects      Patient Care Team:  Arlin Rivas MD as PCP - General (Family Medicine)  Barbara Davila MD PhD as PCP - [employer] PCP     Review of Systems   Constitutional:  Negative for activity change, appetite change, chills and fever.   Eyes:  Negative for visual disturbance.   Respiratory:  Negative for shortness of breath.    Cardiovascular:  Negative for chest pain.   Gastrointestinal:  Negative for abdominal pain.   Skin:  Negative for rash.   Neurological:  Negative for dizziness and light-headedness.   Psychiatric/Behavioral:  Negative for sleep disturbance.        Objective  "  Vitals:  /62   Pulse 72   Temp 36.6 °C (97.8 °F)   Ht 1.6 m (5' 3\")   Wt 54.9 kg (121 lb)   BMI 21.43 kg/m²       Physical Exam  Constitutional:       Appearance: Normal appearance.   HENT:      Head: Normocephalic and atraumatic.      Comments: Speech slurred      Right Ear: Tympanic membrane normal.      Left Ear: Tympanic membrane normal.      Nose: Nose normal.      Mouth/Throat:      Mouth: Mucous membranes are moist.   Eyes:      Pupils: Pupils are equal, round, and reactive to light.   Neck:      Thyroid: No thyromegaly.   Cardiovascular:      Rate and Rhythm: Normal rate and regular rhythm.   Pulmonary:      Effort: Pulmonary effort is normal.      Breath sounds: Normal breath sounds.   Abdominal:      General: Abdomen is flat. Bowel sounds are normal. There is no distension.      Palpations: Abdomen is soft.      Tenderness: There is no guarding or rebound.   Musculoskeletal:         General: No swelling or deformity. Normal range of motion.      Cervical back: Normal range of motion.   Skin:     General: Skin is warm.   Neurological:      General: No focal deficit present.      Mental Status: She is alert.   Psychiatric:         Mood and Affect: Mood normal.       Assessment & Plan  Routine general medical examination at health care facility  Recommendations for women annual wellness exam:  Make sure screenings for cervical, breast, and colon cancer are up to date if applicable- pap smears age 21-65, discuss mammogram starting at age 40, colonoscopy at age 45, earlier if positive family history for breast or colon cancer  Screen for osteoporosis with DXA bone scan starting at age 65 or sooner if risk factors present (long term steroid use, smoking, heavy alcohol use, history of fracture, rheumatoid arthritis, low body weight, family history of hip fracture)  Screening for lung cancer with low-dose CT in all adults age 55-77 who have a 30 pack-year smoking history and currently smoke or have " quit within the past 15 years  Follow a healthy diet (Dash diet, Mediterranean diet)  Exercise 150 min/wk  Maintain healthy weight (BMI < 25)  Do not smoke  Alcohol in moderation (up to 1 drink/day)  Get enough sleep (7-8 hours/night)  Make sure immunizations are up to date (influenza, pneumococcal, Tdap, shingles if age > 50)  Postmenopausal women or women with osteoporosis need minimum 1,200 mg calcium and 800 IU vitamin D daily  Talk to your physician if you have concerns about depression or anxiety  Visit dentist twice yearly    Orders:    1 Year Follow Up In Primary Care - Wellness Exam; Future    Gastroesophageal reflux disease without esophagitis    Orders:    Referral to Gastroenterology; Future    pantoprazole (ProtoNix) 40 mg EC tablet; Take 1 tablet (40 mg) by mouth once daily.    Essential hypertension  Controlled. No changes to blood pressure meds. Recommended to check her bp at home once a week. Goal less than 130/80    Orders:    amLODIPine (Norvasc) 10 mg tablet; Take 1 tablet (10 mg) by mouth once daily.    Pulmonary artery hypertension associated with connective tissue disease  Sees pulm       Pulmonary interstitial fibrosis (Multi)  Sob at baseline   Orders:    Comprehensive Metabolic Panel; Future    Recurrent major depressive disorder, in full remission  Stable  No suicidal ideation   Orders:    Comprehensive Metabolic Panel; Future    FLUoxetine (PROzac) 10 mg capsule; Take 1 capsule (10 mg) by mouth once daily.    Cerebellar ataxia (Multi)  Stable   Residual deficit fr stroke in 2021     Hypothyroidism, unspecified type  Checl tsh   Orders:    Comprehensive Metabolic Panel; Future    TSH with reflex to Free T4 if abnormal; Future    levothyroxine (Synthroid, Levoxyl) 112 mcg tablet; Take 1 tablet (112 mcg) by mouth early in the morning.. Take on an empty stomach at the same time each day, either 30 to 60 minutes prior to breakfast    Age-related osteoporosis without current pathological  fracture  Tolerating meds well   Dexa 12/25   Orders:    alendronate (Fosamax) 70 mg tablet; Take 1 tablet (70 mg) by mouth every 7 days. Take in the morning with a full glass of water, on an empty stomach, and do not take anything else by mouth or lie down for the next 30 min.    Mixed hyperlipidemia  Obtain labs   Orders:    Lipid Panel; Future    atorvastatin (Lipitor) 40 mg tablet; Take 1 tablet (40 mg) by mouth once daily.    PAH (pulmonary arterial hypertension) with portal hypertension (Multi)  Stable        Esophageal dysphagia    Orders:    Referral to Gastroenterology; Future    Borderline diabetes    Orders:    Hemoglobin A1C; Future    CBC and Auto Differential; Future

## 2025-04-03 LAB
ALBUMIN SERPL-MCNC: 4.4 G/DL (ref 3.6–5.1)
ALP SERPL-CCNC: 43 U/L (ref 37–153)
ALT SERPL-CCNC: 27 U/L (ref 6–29)
ANION GAP SERPL CALCULATED.4IONS-SCNC: 9 MMOL/L (CALC) (ref 7–17)
AST SERPL-CCNC: 26 U/L (ref 10–35)
BASOPHILS # BLD AUTO: 41 CELLS/UL (ref 0–200)
BASOPHILS NFR BLD AUTO: 0.7 %
BILIRUB SERPL-MCNC: 0.5 MG/DL (ref 0.2–1.2)
BUN SERPL-MCNC: 17 MG/DL (ref 7–25)
CALCIUM SERPL-MCNC: 9.4 MG/DL (ref 8.6–10.4)
CHLORIDE SERPL-SCNC: 105 MMOL/L (ref 98–110)
CHOLEST SERPL-MCNC: 137 MG/DL
CHOLEST/HDLC SERPL: 2.9 (CALC)
CO2 SERPL-SCNC: 26 MMOL/L (ref 20–32)
CREAT SERPL-MCNC: 0.73 MG/DL (ref 0.5–1.05)
EGFRCR SERPLBLD CKD-EPI 2021: 90 ML/MIN/1.73M2
EOSINOPHIL # BLD AUTO: 218 CELLS/UL (ref 15–500)
EOSINOPHIL NFR BLD AUTO: 3.7 %
ERYTHROCYTE [DISTWIDTH] IN BLOOD BY AUTOMATED COUNT: 12.2 % (ref 11–15)
EST. AVERAGE GLUCOSE BLD GHB EST-MCNC: 120 MG/DL
EST. AVERAGE GLUCOSE BLD GHB EST-SCNC: 6.6 MMOL/L
GLUCOSE SERPL-MCNC: 104 MG/DL (ref 65–99)
HBA1C MFR BLD: 5.8 % OF TOTAL HGB
HCT VFR BLD AUTO: 41.1 % (ref 35–45)
HDLC SERPL-MCNC: 47 MG/DL
HGB BLD-MCNC: 13.5 G/DL (ref 11.7–15.5)
LDLC SERPL CALC-MCNC: 68 MG/DL (CALC)
LYMPHOCYTES # BLD AUTO: 2095 CELLS/UL (ref 850–3900)
LYMPHOCYTES NFR BLD AUTO: 35.5 %
MCH RBC QN AUTO: 30.1 PG (ref 27–33)
MCHC RBC AUTO-ENTMCNC: 32.8 G/DL (ref 32–36)
MCV RBC AUTO: 91.5 FL (ref 80–100)
MONOCYTES # BLD AUTO: 443 CELLS/UL (ref 200–950)
MONOCYTES NFR BLD AUTO: 7.5 %
NEUTROPHILS # BLD AUTO: 3103 CELLS/UL (ref 1500–7800)
NEUTROPHILS NFR BLD AUTO: 52.6 %
NONHDLC SERPL-MCNC: 90 MG/DL (CALC)
PLATELET # BLD AUTO: 212 THOUSAND/UL (ref 140–400)
PMV BLD REES-ECKER: 10.1 FL (ref 7.5–12.5)
POTASSIUM SERPL-SCNC: 4.2 MMOL/L (ref 3.5–5.3)
PROT SERPL-MCNC: 7.3 G/DL (ref 6.1–8.1)
RBC # BLD AUTO: 4.49 MILLION/UL (ref 3.8–5.1)
SODIUM SERPL-SCNC: 140 MMOL/L (ref 135–146)
TRIGL SERPL-MCNC: 139 MG/DL
TSH SERPL-ACNC: 1.45 MIU/L (ref 0.4–4.5)
WBC # BLD AUTO: 5.9 THOUSAND/UL (ref 3.8–10.8)

## 2025-05-28 ENCOUNTER — APPOINTMENT (OUTPATIENT)
Dept: RHEUMATOLOGY | Facility: CLINIC | Age: 70
End: 2025-05-28
Payer: MEDICARE

## 2025-06-24 ENCOUNTER — APPOINTMENT (OUTPATIENT)
Facility: CLINIC | Age: 70
End: 2025-06-24
Payer: MEDICARE

## 2025-06-24 VITALS
WEIGHT: 118.4 LBS | BODY MASS INDEX: 20.98 KG/M2 | SYSTOLIC BLOOD PRESSURE: 144 MMHG | HEIGHT: 63 IN | HEART RATE: 80 BPM | DIASTOLIC BLOOD PRESSURE: 70 MMHG | OXYGEN SATURATION: 94 %

## 2025-06-24 DIAGNOSIS — M34.9 SCLERODERMA (MULTI): Primary | ICD-10-CM

## 2025-06-24 DIAGNOSIS — K21.9 GASTROESOPHAGEAL REFLUX DISEASE WITHOUT ESOPHAGITIS: ICD-10-CM

## 2025-06-24 DIAGNOSIS — R13.19 ESOPHAGEAL DYSPHAGIA: ICD-10-CM

## 2025-06-24 PROCEDURE — 99214 OFFICE O/P EST MOD 30 MIN: CPT | Performed by: INTERNAL MEDICINE

## 2025-06-24 PROCEDURE — 1159F MED LIST DOCD IN RCRD: CPT | Performed by: INTERNAL MEDICINE

## 2025-06-24 PROCEDURE — 3078F DIAST BP <80 MM HG: CPT | Performed by: INTERNAL MEDICINE

## 2025-06-24 PROCEDURE — 3008F BODY MASS INDEX DOCD: CPT | Performed by: INTERNAL MEDICINE

## 2025-06-24 PROCEDURE — 3077F SYST BP >= 140 MM HG: CPT | Performed by: INTERNAL MEDICINE

## 2025-06-24 RX ORDER — PANTOPRAZOLE SODIUM 40 MG/1
40 TABLET, DELAYED RELEASE ORAL
Qty: 90 TABLET | Refills: 1 | Status: SHIPPED | OUTPATIENT
Start: 2025-06-24 | End: 2025-12-21

## 2025-06-24 NOTE — PATIENT INSTRUCTIONS
You have been scheduled for an upper endoscopy (EGD).  You were given instructions for preparing for this test in the office today.  If you have questions about these instructions, please call my office at [phone].    After your procedure, you can expect me to talk to you to go over the results of the procedure.    You were also given information regarding the schedule for your procedure including the time that you need to arrive to the endoscopy unit.  You will also be contacted 2-3 day prior to your procedure to confirm the final arrival time.  If you have questions about this or if you need to cancel or change this appointment please call my office at [phone].  \

## 2025-06-24 NOTE — PROGRESS NOTES
[facility] Gastroenterology    ASSESSMENT and PLAN:       Angelica Craven is a 70 y.o. female with a significant past medical history of  scleroderma, pulmonary hypertension, pulm interstitial fibrosis, cerebellar ataxia, chronic diastolic heart failure  who presents for consultation requested by her primary care provider (Arlin Rivas MD) for the evaluation of dysphagia.       Dysphagia    EGD in 2019 at [facility] that was normal  - Repeat EGD for evaluation open in the OR due to patient underlying scleroderma and portal hypertension  - EGD negative consider manometry      Risk and benefits of the endoscopic procedure including bleeding perforation and infection were discussed with patient and they wish to proceed          Orville Rehman DO         Gastroenterology    [facility]            Subjective   HISTORY OF PRESENT ILLNESS:     Chief Complaint  Dysphagia (Patient wanted to discuss Protonix.  PCP advised her that she has been taking it too long./Colon 5/20/24/Last seen 4/19/24)    History Of Present Illness:    Angelica Craven is a 70 y.o. female with a significant past medical history of  scleroderma, pulmonary hypertension, pulm interstitial fibrosis, cerebellar ataxia, chronic diastolic heart failure  who presents for consultation requested by her primary care provider (Arlin Rivas MD) for the evaluation of dysphagia.       Patient that she has had ongoing dysphagia, progressively worse.  States history of sounds like dilation.  Last EGD was in 2019 at [facility] no mechanical etiology of patient's patient was seen at that time.  Patient is maintained on PPI with good results.  Patient denies any heartburn/GERD, N/V, dysphagia, odynophagia, abdominal pain, diarrhea, constipation, hematemesis, hematochezia, melena, or weight loss.      Endoscopy History:    Colonoscopy 5/2024 scattered diverticula internal hemorrhoids recommend repeat 7  "years  EGD in 2019 at [facility] Adkins's esophagus as reported this was biopsied no biopsies were available for review the pathology could not be found in Care Everywhere  Review of systems:   Review of Systems      I performed a complete 10 point review of systems and it is negative except as noted in HPI or above.        PAST HISTORIES:       Past Medical History:  She has a past medical history of Anxiety, Arthritis, Cataract, CHF (congestive heart failure), Dental disease, Depression, Disease of thyroid gland, GERD (gastroesophageal reflux disease), Hearing aid worn, HL (hearing loss), Hyperlipidemia, Hypertension, Pulmonary arterial hypertension (Multi), Pulmonary fibrosis (Multi), Scleroderma (Multi), Shortness of breath, Stroke (Multi), and Vision loss.    Past Surgical History:  She has a past surgical history that includes Breast biopsy (2015); Cardiac catheterization; and Esophagus surgery (2019).      Social History:  She reports that she has never smoked. She has never used smokeless tobacco. She reports that she does not drink alcohol and does not use drugs.    Family History:  No known GI disease, specifically denies pancreatitis, Crohn's, colon cancer, gastroesophageal cancer, or ulcerative colitis.    Family History[1]     Allergies:  Patient has no known allergies.        Objective   OBJECTIVE:       Last Recorded Vitals:  Vitals:    06/24/25 1128   BP: 144/70   BP Location: Left arm   Patient Position: Sitting   BP Cuff Size: Adult   Pulse: 80   SpO2: 94%   Weight: 53.7 kg (118 lb 6.4 oz)   Height: 1.6 m (5' 3\")     /70 (BP Location: Left arm, Patient Position: Sitting, BP Cuff Size: Adult)   Pulse 80   Ht 1.6 m (5' 3\")   Wt 53.7 kg (118 lb 6.4 oz)   SpO2 94%   BMI 20.97 kg/m²      Physical Exam:    Physical Exam        Home Medications:  Prior to Admission medications    Medication Sig Start Date End Date Taking? Authorizing Provider   albuterol (Ventolin HFA) 90 mcg/actuation inhaler Inhale " 2 puffs every 6 hours if needed for wheezing.   Yes Historical Provider, MD   alendronate (Fosamax) 70 mg tablet Take 1 tablet (70 mg) by mouth every 7 days. Take in the morning with a full glass of water, on an empty stomach, and do not take anything else by mouth or lie down for the next 30 min. 4/1/25 9/28/25 Yes Arlin Rivas MD   amLODIPine (Norvasc) 10 mg tablet Take 1 tablet (10 mg) by mouth once daily. 4/1/25 9/28/25 Yes Arlin Rivas MD   aspirin 81 mg EC tablet Take 1 tablet (81 mg) by mouth once daily. 2/24/22  Yes Historical Provider, MD   atorvastatin (Lipitor) 40 mg tablet Take 1 tablet (40 mg) by mouth once daily. 4/1/25 9/28/25 Yes Arlin Rivas MD   FLUoxetine (PROzac) 10 mg capsule Take 1 capsule (10 mg) by mouth once daily. 4/1/25 9/28/25 Yes Arlin Rivas MD   levothyroxine (Synthroid, Levoxyl) 112 mcg tablet Take 1 tablet (112 mcg) by mouth early in the morning.. Take on an empty stomach at the same time each day, either 30 to 60 minutes prior to breakfast 4/1/25 9/28/25 Yes Arlin Rivas MD   macitentan (Opsumit) 10 mg tablet tablet Take 1 tablet (10 mg) by mouth once daily. 2/17/23  Yes Historical Provider, MD   multivitamin with minerals tablet Take 1 tablet by mouth once daily.   Yes Historical Provider, MD   omega-3 fatty acids-fish oil 340-1,000 mg capsule Take 1 capsule by mouth.   Yes Historical Provider, MD   pantoprazole (ProtoNix) 40 mg EC tablet Take 1 tablet (40 mg) by mouth once daily. 4/1/25 9/28/25 Yes Arlin Rivas MD   tadalafil (tadalafil, antihypertensive,) 20 mg tablet Take 2 tablets (40 mg) by mouth once daily. 12/1/23  Yes Historical Provider, MD   Kameron 128 5 % ophthalmic ointment Apply 1 Application to left eye once daily at bedtime.  Patient not taking: Reported on 6/24/2025 7/26/24   Historical Provider, MD         Relevant Results Recent labs reviewed in the EMR.  Lab Results   Component Value Date    HGB 13.5 04/02/2025    HGB 12.9 05/10/2024    MCV  "91.5 04/02/2025    MCV 89 05/10/2024     04/02/2025     05/10/2024       No results found for: \"FERRITIN\", \"IRON\"    Lab Results   Component Value Date     04/02/2025    K 4.2 04/02/2025     04/02/2025    BUN 17 04/02/2025    CREATININE 0.73 04/02/2025       Lab Results   Component Value Date    BILITOT 0.5 04/02/2025     Lab Results   Component Value Date    ALT 27 04/02/2025    AST 26 04/02/2025    ALKPHOS 43 04/02/2025       No results found for: \"CRP\"    No results found for: \"CALPS\"    Radiology: Reviewed imaging reviewed in the EMR.  Imaging  No results found.    Cardiology, Vascular, and Other Imaging  No other imaging results found for the past 7 days             [1]   Family History  Problem Relation Name Age of Onset    Coronary artery disease Mother Christie         CABG 3 V    Arthritis Mother Christie     Heart disease Mother Christie     Other (pacemaker) Father Herb     Heart disease Father Herb     Diabetes Sister Milagros     Asthma Son Omar     Asthma Son Omar     Asthma Son Omar     Asthma Son Krzysztof      "

## 2025-07-09 ENCOUNTER — PRE-ADMISSION TESTING (OUTPATIENT)
Dept: PREADMISSION TESTING | Facility: HOSPITAL | Age: 70
End: 2025-07-09
Payer: MEDICARE

## 2025-07-09 VITALS
RESPIRATION RATE: 16 BRPM | SYSTOLIC BLOOD PRESSURE: 135 MMHG | BODY MASS INDEX: 21.23 KG/M2 | DIASTOLIC BLOOD PRESSURE: 78 MMHG | TEMPERATURE: 98.9 F | WEIGHT: 119.8 LBS | HEIGHT: 63 IN | HEART RATE: 71 BPM | OXYGEN SATURATION: 95 %

## 2025-07-09 DIAGNOSIS — R13.10 DYSPHAGIA, UNSPECIFIED TYPE: ICD-10-CM

## 2025-07-09 DIAGNOSIS — Z01.818 PRE-OP EVALUATION: Primary | ICD-10-CM

## 2025-07-09 DIAGNOSIS — Z87.39 HX OF SCLERODERMA: ICD-10-CM

## 2025-07-09 LAB
ANION GAP SERPL CALC-SCNC: 12 MMOL/L (ref 10–20)
BUN SERPL-MCNC: 13 MG/DL (ref 6–23)
CALCIUM SERPL-MCNC: 9.4 MG/DL (ref 8.6–10.3)
CHLORIDE SERPL-SCNC: 105 MMOL/L (ref 98–107)
CO2 SERPL-SCNC: 24 MMOL/L (ref 21–32)
CREAT SERPL-MCNC: 0.64 MG/DL (ref 0.5–1.05)
EGFRCR SERPLBLD CKD-EPI 2021: >90 ML/MIN/1.73M*2
ERYTHROCYTE [DISTWIDTH] IN BLOOD BY AUTOMATED COUNT: 13 % (ref 11.5–14.5)
GLUCOSE SERPL-MCNC: 105 MG/DL (ref 74–99)
HCT VFR BLD AUTO: 39.6 % (ref 36–46)
HGB BLD-MCNC: 13.5 G/DL (ref 12–16)
MCH RBC QN AUTO: 30.5 PG (ref 26–34)
MCHC RBC AUTO-ENTMCNC: 34.1 G/DL (ref 32–36)
MCV RBC AUTO: 89 FL (ref 80–100)
NRBC BLD-RTO: 0 /100 WBCS (ref 0–0)
PLATELET # BLD AUTO: 189 X10*3/UL (ref 150–450)
POTASSIUM SERPL-SCNC: 4 MMOL/L (ref 3.5–5.3)
RBC # BLD AUTO: 4.43 X10*6/UL (ref 4–5.2)
SODIUM SERPL-SCNC: 137 MMOL/L (ref 136–145)
WBC # BLD AUTO: 5.6 X10*3/UL (ref 4.4–11.3)

## 2025-07-09 PROCEDURE — 36415 COLL VENOUS BLD VENIPUNCTURE: CPT

## 2025-07-09 PROCEDURE — 99214 OFFICE O/P EST MOD 30 MIN: CPT | Performed by: CLINICAL NURSE SPECIALIST

## 2025-07-09 PROCEDURE — 80048 BASIC METABOLIC PNL TOTAL CA: CPT

## 2025-07-09 PROCEDURE — 85027 COMPLETE CBC AUTOMATED: CPT

## 2025-07-09 ASSESSMENT — DUKE ACTIVITY SCORE INDEX (DASI)
CAN YOU PARTICIPATE IN STRENOUS SPORTS LIKE SWIMMING, SINGLES TENNIS, FOOTBALL, BASKETBALL, OR SKIING: NO
CAN YOU DO MODERATE WORK AROUND THE HOUSE LIKE VACUUMING, SWEEPING FLOORS OR CARRYING GROCERIES: YES
DASI METS SCORE: 8
CAN YOU WALK INDOORS, SUCH AS AROUND YOUR HOUSE: YES
CAN YOU DO HEAVY WORK AROUND THE HOUSE LIKE SCRUBBING FLOORS OR LIFTING AND MOVING HEAVY FURNITURE: YES
CAN YOU PARTICIPATE IN MODERATE RECREATIONAL ACTIVITIES LIKE GOLF, BOWLING, DANCING, DOUBLES TENNIS OR THROWING A BASEBALL OR FOOTBALL: YES
CAN YOU TAKE CARE OF YOURSELF (EAT, DRESS, BATHE, OR USE TOILET): YES
CAN YOU RUN A SHORT DISTANCE: NO
CAN YOU CLIMB A FLIGHT OF STAIRS OR WALK UP A HILL: YES
CAN YOU DO YARD WORK LIKE RAKING LEAVES, WEEDING OR PUSHING A MOWER: YES
CAN YOU HAVE SEXUAL RELATIONS: YES
CAN YOU WALK A BLOCK OR TWO ON LEVEL GROUND: YES
CAN YOU DO LIGHT WORK AROUND THE HOUSE LIKE DUSTING OR WASHING DISHES: YES
TOTAL_SCORE: 42.7

## 2025-07-09 ASSESSMENT — ENCOUNTER SYMPTOMS
PSYCHIATRIC NEGATIVE: 1
GASTROINTESTINAL NEGATIVE: 1
RESPIRATORY NEGATIVE: 1
ENDOCRINE NEGATIVE: 1
ALLERGIC/IMMUNOLOGIC NEGATIVE: 1
HEMATOLOGIC/LYMPHATIC NEGATIVE: 1
NEUROLOGICAL NEGATIVE: 1
CONSTITUTIONAL NEGATIVE: 1
EYES NEGATIVE: 1
MUSCULOSKELETAL NEGATIVE: 1
CARDIOVASCULAR NEGATIVE: 1

## 2025-07-09 ASSESSMENT — LIFESTYLE VARIABLES: SMOKING_STATUS: NONSMOKER

## 2025-07-09 NOTE — H&P
History Of Present Illness  Angelica Craven is a very pleasant 70 y.o. female presenting with hx of dysphagia, GERD, esophagitis, and scleroderma. More recently she's had more difficulty swallowing, is choking even on thin liquids, and food is become stuck in her esophagus. She is scheduled for an EGD under MAC anesthesia per Dr. Rehman on 7/25/25.     Please note: patient is a difficult IV stick d/t scleroderma  - skin and vascular changes.        Past Medical History  Past Medical History:   Diagnosis Date    Anxiety     Arthritis     Adkins esophagus     Cataract     CHF (congestive heart failure)     Dental disease     Depression     Disease of thyroid gland     GERD (gastroesophageal reflux disease)     Hearing aid worn     HL (hearing loss)     Hyperlipidemia     Hypertension     Pulmonary arterial hypertension (Multi)     Pulmonary fibrosis (Multi)     Scleroderma (Multi)     Shortness of breath     Stroke (Multi)     Vision loss        Surgical History  Past Surgical History:   Procedure Laterality Date    BREAST BIOPSY  2015    CARDIAC CATHETERIZATION      ESOPHAGOGASTRODUODENOSCOPY      ESOPHAGUS SURGERY  2019    polyps removed and dilation several times.    LIVER BIOPSY      normal results        Social History  She reports that she has never smoked. She has never used smokeless tobacco. She reports that she does not drink alcohol and does not use drugs.    Family History  Family History   Problem Relation Name Age of Onset    Coronary artery disease Mother Christie         CABG 3 V    Arthritis Mother Christie     Heart disease Mother Christie     Other (pacemaker) Father Herb     Heart disease Father Herb     Diabetes Sister Milagros     Asthma Son Omar     Asthma Son Omar     Asthma Son Omar     Asthma Son Krzysztof         Allergies  Patient has no known allergies.    Review of Systems   Constitutional: Negative.    HENT: Negative.     Eyes: Negative.    Respiratory: Negative.      Cardiovascular: Negative.    Gastrointestinal: Negative.    Endocrine: Negative.    Genitourinary:         Dysphagia and discomfort, even with thin liquids. Chokes often. Also feels that food is becoming stuck mid sternal area. She has had esophageal dilation in the past d/t her scleroderma.    Musculoskeletal: Negative.    Skin: Negative.    Allergic/Immunologic: Negative.    Neurological: Negative.    Hematological: Negative.    Psychiatric/Behavioral: Negative.     All other systems reviewed and are negative.       Physical Exam  Vitals and nursing note reviewed.   Constitutional:       Appearance: Normal appearance.   HENT:      Head: Normocephalic.      Nose: Nose normal.      Mouth/Throat:      Comments:   Mallampati: 2  TMD: >3  Finger breadth: 3  Dentition: Full upper denture  Neck ROM: limited     Eyes:      Pupils: Pupils are equal, round, and reactive to light.   Cardiovascular:      Rate and Rhythm: Normal rate and regular rhythm.      Heart sounds: Normal heart sounds, S1 normal and S2 normal.   Pulmonary:      Effort: Pulmonary effort is normal.      Breath sounds: Decreased air movement present. Decreased breath sounds present.      Comments: Lungs diminished throughout with some decreased excursion. Patient wears 2L NC of O2 at HS.   Abdominal:      General: Bowel sounds are normal.      Palpations: Abdomen is soft.      Comments: Bowel sounds active x4 quads    Musculoskeletal:         General: Normal range of motion.      Cervical back: Normal range of motion.      Right lower leg: No edema.      Left lower leg: No edema.   Skin:     General: Skin is warm and dry.      Findings: Petechiae present.      Comments: Scleroderma, raynaud's to anna hands/all fingers.   Fingers are pale, with robert coloration and petechiae to palm sides bilaterally. Small ulcers to fingertips.    Neurological:      General: No focal deficit present.      Mental Status: She is alert and oriented to person, place, and  "time.      Comments: CVA in 2021 with chronic right side weakness.  Voice shaking.    Psychiatric:         Mood and Affect: Mood normal.         Behavior: Behavior normal.         Thought Content: Thought content normal.         Judgment: Judgment normal.          Last Recorded Vitals  Blood pressure 135/78, pulse 71, temperature 37.2 °C (98.9 °F), temperature source Temporal, resp. rate 16, height 1.6 m (5' 3\"), weight 54.3 kg (119 lb 12.8 oz), SpO2 95%.    DASI Risk Score      Flowsheet Row Pre-Admission Testing from 7/9/2025 in  [facility]   Can you take care of yourself (eat, dress, bathe, or use toilet)?  2.75 filed at 07/09/2025 1005   Can you walk indoors, such as around your house? 1.75 filed at 07/09/2025 1005   Can you walk a block or two on level ground?  2.75 filed at 07/09/2025 1005   Can you climb a flight of stairs or walk up a hill? 5.5 filed at 07/09/2025 1005   Can you run a short distance? 0 filed at 07/09/2025 1005   Can you do light work around the house like dusting or washing dishes? 2.7 filed at 07/09/2025 1005   Can you do moderate work around the house like vacuuming, sweeping floors or carrying groceries? 3.5 filed at 07/09/2025 1005   Can you do heavy work around the house like scrubbing floors or lifting and moving heavy furniture?  8 filed at 07/09/2025 1005   Can you do yard work like raking leaves, weeding or pushing a mower? 4.5 filed at 07/09/2025 1005   Can you have sexual relations? 5.25 filed at 07/09/2025 1005   Can you participate in moderate recreational activities like golf, bowling, dancing, doubles tennis or throwing a baseball or football? 6 filed at 07/09/2025 1005   Can you participate in strenous sports like swimming, singles tennis, football, basketball, or skiing? 0 filed at 07/09/202509/2025 1005   DASI SCORE 42.7 filed at 07/09/2025 1005   METS Score (Will be calculated only when all the questions are answered) 8 filed at 07/09/2025 1005          Caprini DVT " Assessment      Flowsheet Row Pre-Admission Testing from 7/9/2025 in  [facility] Pre-Admission Testing from 5/10/2024 in  [facility]   DVT Score (IF A SCORE IS NOT CALCULATING, MUST SELECT A BMI TO COMPLETE) 6 filed at 07/09/2025 0819 10 filed at 05/10/2024 1206   Medical Factors Present cancer, chemotherapy, or previous malignancy filed at 07/09/2025 0819 --   Surgical Factors Minor surgery planned filed at 07/09/2025 0819 --   BMI (BMI MUST BE CHOSEN) 30 or less filed at 07/09/2025 0819 30 or less filed at 05/10/2024 1206   RETIRED: Current Status -- Minor surgery planned filed at 05/10/2024 1206   RETIRED: History -- Prior major surgery, Stroke filed at 05/10/2024 1206   RETIRED: Age -- 60-75 years filed at 05/10/2024 1206          Modified Frailty Index    No data to display       [id number] Stroke Risk Points  Current as of just now        N/A 0 to 9 Points:      Last Change: N/A          The [id number] risk score (Lip JR, et al. 2009. © 2010 [employer]) quantifies the risk of stroke for a patient with atrial fibrillation. For patients without atrial fibrillation or under the age of 18 this score appears as N/A. Higher score values generally indicate higher risk of stroke.        This score is not applicable to this patient. Components are not calculated.          Revised Cardiac Risk Index      Flowsheet Row Pre-Admission Testing from 7/9/2025 in  [facility] Pre-Admission Testing from 5/10/2024 in  [facility]   High-Risk Surgery (Intraperitoneal, Intrathoracic,Suprainguinal vascular) 0 filed at 07/09/2025 0820 0 filed at 05/10/2024 1207   History of ischemic heart disease (History of MI, History of positive exercuse test, Current chest paint considered due to myocardial ischemia, Use of nitrate therapy, ECG with pathological Q Waves) 0 filed at 07/09/2025 0820 0 filed at 05/10/2024 1207   History of congestive heart  failure (pulmonary edemia, bilateral rales or S3 gallop, Paroxysmal nocturnal dyspnea, CXR showing pulmonary vascular redistribution) 1 filed at 07/09/2025 0820 1  [Pulm HTN hx] filed at 05/10/2024 1207   History of cerebrovascular disease (Prior TIA or stroke) 1 filed at 07/09/2025 0820 1 filed at 05/10/2024 1207   Pre-operative insulin treatment 0 filed at 07/09/2025 0820 0 filed at 05/10/2024 1207   Pre-operative creatinine>2 mg/dl 0 filed at 07/09/2025 0820 0 filed at 05/10/2024 1207   Revised Cardiac Risk Calculator 2 filed at 07/09/2025 0820 2 filed at 05/10/2024 1207          Apfel Simplified Score      Flowsheet Row Pre-Admission Testing from 7/9/2025 in  [facility] Pre-Admission Testing from 5/10/2024 in  [facility]   Smoking status 1 filed at 07/09/2025 0820 1 filed at 05/10/2024 1207   History of motion sickness or PONV  0 filed at 07/09/2025 0820 0 filed at 05/10/2024 1207   Use of postoperative opioids 0 filed at 07/09/2025 0820 0 filed at 05/10/2024 1207   Gender - Female 1=Yes filed at 07/09/2025 0820 --   Apfel Simplified Score Calculator 2 filed at 07/09/2025 0820 2 filed at 05/10/2024 1207          Risk Analysis Index Results This Encounter    No data found in the last 10 encounters.       Stop Bang Score      Flowsheet Row Pre-Admission Testing from 7/9/2025 in  [facility] COLONOSCOPY in OR from 5/20/2024 in [facility] OR with Orville Rehman, DO   Do you snore loudly? 0 filed at 07/09/2025 1003 0 filed at 05/20/2024 0635   Do you often feel tired or fatigued after your sleep? 1 filed at 07/09/2025 1003 0 filed at 05/20/2024 0635   Has anyone ever observed you stop breathing in your sleep? 0 filed at 07/09/2025 1003 0 filed at 05/20/2024 0635   Do you have or are you being treated for high blood pressure? 1 filed at 07/09/2025 1003 1 filed at 05/20/2024 0635   Recent BMI (Calculated) 21 filed at 07/09/2025 1003 22.2 filed at  05/20/2024 0635   Is BMI greater than 35 kg/m2? 0=No filed at 07/09/2025 1003 0=No filed at 05/20/2024 0635   Age older than 50 years old? 1=Yes filed at 07/09/2025 1003 1=Yes filed at 05/20/2024 0635   Is your neck circumference greater than 17 inches (Male) or 16 inches (Female)? 0 filed at 07/09/2025 1003 0 filed at 05/20/2024 0635   Gender - Male 0=No filed at 07/09/2025 1003 0=No filed at 05/20/2024 0635   STOP-BANG Total Score 3 filed at 07/09/2025 1003 2 filed at 05/20/2024 0635          Prodigy: High Risk  Total Score: 15              Prodigy Age Score      Prodigy CHF score          ARISCAT Score for Postoperative Pulmonary Complications      Flowsheet Row Pre-Admission Testing from 7/9/2025 in  [facility]   Age Calculated Score 3 filed at 07/09/2025 0820   Preoperative SpO2 8 filed at 07/09/2025 0820   Respiratory infection in the last month Either upper or lower (i.e., URI, bronchitis, pneumonia), with fever and antibiotic treatment 0 filed at 07/09/2025 0820   Preoperative anemia (Hgb less than 10 g/dl) 0 filed at 07/09/2025 0820   Surgical incision  0 filed at 07/09/2025 0820   Duration of surgery  0 filed at 07/09/2025 0820   Emergency Procedure  0 filed at 07/09/2025 0820   ARISCAT Total Score  11 filed at 07/09/2025 0820          Claudine Perioperative Risk for Myocardial Infarction or Cardiac Arrest (KIERA)      Flowsheet Row Pre-Admission Testing from 7/9/2025 in  [facility]   Calculated Age Score 1.38 filed at 07/09/2025 0820   Functional Status  0 filed at 07/09/2025 0820   ASA Class  -1.92 filed at 07/09/2025 0820   Creatinine -0.10 filed at 07/09/2025 0820   Type of Procedure  1.13 filed at 07/09/2025 0820   KIERA Total Score  -4.76 filed at 07/09/2025 0820   KIERA % 0.85 filed at 07/09/2025 0820          Relevant Results  Results for orders placed or performed in visit on 07/09/25 (from the past 24 hours)   Basic Metabolic Panel   Result Value Ref Range    Glucose 105  (H) 74 - 99 mg/dL    Sodium 137 136 - 145 mmol/L    Potassium 4.0 3.5 - 5.3 mmol/L    Chloride 105 98 - 107 mmol/L    Bicarbonate 24 21 - 32 mmol/L    Anion Gap 12 10 - 20 mmol/L    Urea Nitrogen 13 6 - 23 mg/dL    Creatinine 0.64 0.50 - 1.05 mg/dL    eGFR >90 >60 mL/min/1.73m*2    Calcium 9.4 8.6 - 10.3 mg/dL   CBC   Result Value Ref Range    WBC 5.6 4.4 - 11.3 x10*3/uL    nRBC 0.0 0.0 - 0.0 /100 WBCs    RBC 4.43 4.00 - 5.20 x10*6/uL    Hemoglobin 13.5 12.0 - 16.0 g/dL    Hematocrit 39.6 36.0 - 46.0 %    MCV 89 80 - 100 fL    MCH 30.5 26.0 - 34.0 pg    MCHC 34.1 32.0 - 36.0 g/dL    RDW 13.0 11.5 - 14.5 %    Platelets 189 150 - 450 x10*3/uL             Diagnoses and all orders for this visit:  Pre-op evaluation  -     Basic Metabolic Panel; Future  -     CBC; Future  -     ECG 12 Lead; Future  Dysphagia, unspecified type  -     Basic Metabolic Panel; Future  -     CBC; Future  -     ECG 12 Lead; Future  Hx of scleroderma  -     Basic Metabolic Panel; Future  -     CBC; Future  -     ECG 12 Lead; Future    Dysphagia, GERD, esophagitis, and scleroderma. She is scheduled for an EGD under MAC anesthesia per Dr. Rehman on 7/25/25.   CBC, BMP ordered. Reviewed and these are WNL and acceptable for upcoming surgery.   Hx of scleroderma, EKG ordered. Shows SR with a rate of 65 bpm.    H&P and airway assessment completed today.  OK to take amlodipine with a small sip of water the morning of surgery.   All surgery instructions reviewed with patient by RN. Verbalized understanding.   Difficult IV stick d/t scleroderma skin and vascular changes. Needs very small needle size.   Patient will need earlobe/forehead pulse ox monitoring d/t scleroderma/raynaud's in fingers.       Chasity Blankenship, APRN-CNS

## 2025-07-09 NOTE — PREPROCEDURE INSTRUCTIONS
Medication List            Accurate as of July 9, 2025 10:05 AM. Always use your most recent med list.                alendronate 70 mg tablet  Commonly known as: Fosamax  Take 1 tablet (70 mg) by mouth every 7 days. Take in the morning with a full glass of water, on an empty stomach, and do not take anything else by mouth or lie down for the next 30 min.     amLODIPine 10 mg tablet  Commonly known as: Norvasc  Take 1 tablet (10 mg) by mouth once daily.     aspirin 81 mg EC tablet     atorvastatin 40 mg tablet  Commonly known as: Lipitor  Take 1 tablet (40 mg) by mouth once daily.     FLUoxetine 10 mg capsule  Commonly known as: PROzac  Take 1 capsule (10 mg) by mouth once daily.     levothyroxine 112 mcg tablet  Commonly known as: Synthroid, Levoxyl  Take 1 tablet (112 mcg) by mouth early in the morning.. Take on an empty stomach at the same time each day, either 30 to 60 minutes prior to breakfast     macitentan 10 mg tablet tablet  Commonly known as: Opsumit     multivitamin with minerals tablet     Kameron 128 5 % ophthalmic ointment  Generic drug: sodium chloride     omega-3 fatty acids-fish oil 340-1,000 mg capsule     pantoprazole 40 mg EC tablet  Commonly known as: ProtoNix  Take 1 tablet (40 mg) by mouth once daily.     tadalafil 20 mg tablet     Ventolin HFA 90 mcg/actuation inhaler  Generic drug: albuterol                   Pre-Procedure Instructions :    Use inhalers in the morning if needed.   Hold all medications the morning of the procedure except take Amlodipine with a sip of water.     Do not drink any liquid after midnight the night before your surgery  Do not eat any food after midnight the night before your surgery/procedure.  Candy, gum, mints and smoking of cigarettes, marijuana or vaping is not permitted after midnight prior to your surgery   Do not drink Alcohol 24 hours prior to surgery      Increase fluid intake day before surgery    Additional Instructions:      Review your medication  instructions, take indicated medications    Wear  comfortable loose fitting clothing  Do not use moisturizers, creams, lotions or perfume  All jewelry and valuables should be left at home. May bring glasses and partials.    Stop blood thinning medications as instructed by ordering physician or surgeon    Shower or bathe the night before or day of surgery.   Brush teeth and avoid perfumes, colognes, powders, makeup, aftershave and hair spray    Go to Registration, in the main lobby, upon arrival on the day of surgery and have 's license and medical insurance card available.    Call [phone] the day before your surgery/procedure to find out what time you are to arrive the next day.  Call after 12:30 pm.     Please have a responsible adult to drive you home and be available to help you as needed after surgery.   Cannot use public transportation or an insurance service for your ride home.

## 2025-07-21 ENCOUNTER — ANESTHESIA EVENT (OUTPATIENT)
Dept: OPERATING ROOM | Facility: HOSPITAL | Age: 70
End: 2025-07-21
Payer: MEDICARE

## 2025-07-25 ENCOUNTER — HOSPITAL ENCOUNTER (OUTPATIENT)
Dept: OPERATING ROOM | Facility: HOSPITAL | Age: 70
Discharge: HOME | End: 2025-07-25
Payer: MEDICARE

## 2025-07-25 ENCOUNTER — ANESTHESIA (OUTPATIENT)
Dept: OPERATING ROOM | Facility: HOSPITAL | Age: 70
End: 2025-07-25
Payer: MEDICARE

## 2025-07-25 VITALS
RESPIRATION RATE: 10 BRPM | WEIGHT: 118 LBS | HEIGHT: 63 IN | SYSTOLIC BLOOD PRESSURE: 140 MMHG | OXYGEN SATURATION: 96 % | HEART RATE: 53 BPM | TEMPERATURE: 97.6 F | DIASTOLIC BLOOD PRESSURE: 71 MMHG | BODY MASS INDEX: 20.91 KG/M2

## 2025-07-25 DIAGNOSIS — M34.9 SCLERODERMA (MULTI): ICD-10-CM

## 2025-07-25 DIAGNOSIS — K21.9 GASTROESOPHAGEAL REFLUX DISEASE WITHOUT ESOPHAGITIS: ICD-10-CM

## 2025-07-25 DIAGNOSIS — R13.19 ESOPHAGEAL DYSPHAGIA: ICD-10-CM

## 2025-07-25 PROCEDURE — 3700000001 HC GENERAL ANESTHESIA TIME - INITIAL BASE CHARGE: Performed by: NURSE ANESTHETIST, CERTIFIED REGISTERED

## 2025-07-25 PROCEDURE — 7100000010 HC PHASE TWO TIME - EACH INCREMENTAL 1 MINUTE: Performed by: NURSE ANESTHETIST, CERTIFIED REGISTERED

## 2025-07-25 PROCEDURE — 7100000001 HC RECOVERY ROOM TIME - INITIAL BASE CHARGE: Performed by: NURSE ANESTHETIST, CERTIFIED REGISTERED

## 2025-07-25 PROCEDURE — 3600000002 HC OR TIME - INITIAL BASE CHARGE - PROCEDURE LEVEL TWO: Performed by: NURSE ANESTHETIST, CERTIFIED REGISTERED

## 2025-07-25 PROCEDURE — 7100000009 HC PHASE TWO TIME - INITIAL BASE CHARGE: Performed by: NURSE ANESTHETIST, CERTIFIED REGISTERED

## 2025-07-25 PROCEDURE — 2500000004 HC RX 250 GENERAL PHARMACY W/ HCPCS (ALT 636 FOR OP/ED): Mod: JW | Performed by: NURSE ANESTHETIST, CERTIFIED REGISTERED

## 2025-07-25 PROCEDURE — 2500000004 HC RX 250 GENERAL PHARMACY W/ HCPCS (ALT 636 FOR OP/ED): Performed by: ANESTHESIOLOGY

## 2025-07-25 PROCEDURE — 7100000002 HC RECOVERY ROOM TIME - EACH INCREMENTAL 1 MINUTE: Performed by: NURSE ANESTHETIST, CERTIFIED REGISTERED

## 2025-07-25 PROCEDURE — 43239 EGD BIOPSY SINGLE/MULTIPLE: CPT | Performed by: INTERNAL MEDICINE

## 2025-07-25 PROCEDURE — 3700000002 HC GENERAL ANESTHESIA TIME - EACH INCREMENTAL 1 MINUTE: Performed by: NURSE ANESTHETIST, CERTIFIED REGISTERED

## 2025-07-25 PROCEDURE — 3600000007 HC OR TIME - EACH INCREMENTAL 1 MINUTE - PROCEDURE LEVEL TWO: Performed by: NURSE ANESTHETIST, CERTIFIED REGISTERED

## 2025-07-25 RX ORDER — ONDANSETRON HYDROCHLORIDE 2 MG/ML
4 INJECTION, SOLUTION INTRAVENOUS ONCE AS NEEDED
Status: DISCONTINUED | OUTPATIENT
Start: 2025-07-25 | End: 2025-07-26 | Stop reason: HOSPADM

## 2025-07-25 RX ORDER — SODIUM CHLORIDE, SODIUM LACTATE, POTASSIUM CHLORIDE, CALCIUM CHLORIDE 600; 310; 30; 20 MG/100ML; MG/100ML; MG/100ML; MG/100ML
75 INJECTION, SOLUTION INTRAVENOUS CONTINUOUS
Status: ACTIVE | OUTPATIENT
Start: 2025-07-25 | End: 2025-07-25

## 2025-07-25 RX ORDER — PROPOFOL 10 MG/ML
INJECTION, EMULSION INTRAVENOUS AS NEEDED
Status: DISCONTINUED | OUTPATIENT
Start: 2025-07-25 | End: 2025-07-25

## 2025-07-25 RX ORDER — OXYCODONE HYDROCHLORIDE 5 MG/1
5 TABLET ORAL EVERY 4 HOURS PRN
Status: DISCONTINUED | OUTPATIENT
Start: 2025-07-25 | End: 2025-07-26 | Stop reason: HOSPADM

## 2025-07-25 RX ORDER — MORPHINE SULFATE 2 MG/ML
1 INJECTION, SOLUTION INTRAMUSCULAR; INTRAVENOUS EVERY 5 MIN PRN
Status: DISCONTINUED | OUTPATIENT
Start: 2025-07-25 | End: 2025-07-26 | Stop reason: HOSPADM

## 2025-07-25 RX ORDER — SODIUM CHLORIDE, SODIUM LACTATE, POTASSIUM CHLORIDE, CALCIUM CHLORIDE 600; 310; 30; 20 MG/100ML; MG/100ML; MG/100ML; MG/100ML
100 INJECTION, SOLUTION INTRAVENOUS CONTINUOUS
Status: ACTIVE | OUTPATIENT
Start: 2025-07-25 | End: 2025-07-25

## 2025-07-25 RX ORDER — FAMOTIDINE 10 MG/ML
20 INJECTION, SOLUTION INTRAVENOUS ONCE
Status: COMPLETED | OUTPATIENT
Start: 2025-07-25 | End: 2025-07-25

## 2025-07-25 RX ORDER — LIDOCAINE HYDROCHLORIDE 10 MG/ML
0.1 INJECTION, SOLUTION EPIDURAL; INFILTRATION; INTRACAUDAL; PERINEURAL ONCE
Status: DISCONTINUED | OUTPATIENT
Start: 2025-07-25 | End: 2025-07-26 | Stop reason: HOSPADM

## 2025-07-25 RX ADMIN — FAMOTIDINE 20 MG: 10 INJECTION, SOLUTION INTRAVENOUS at 08:03

## 2025-07-25 RX ADMIN — PROPOFOL 50 MG: 10 INJECTION, EMULSION INTRAVENOUS at 08:30

## 2025-07-25 RX ADMIN — SODIUM CHLORIDE, POTASSIUM CHLORIDE, SODIUM LACTATE AND CALCIUM CHLORIDE: 600; 310; 30; 20 INJECTION, SOLUTION INTRAVENOUS at 08:24

## 2025-07-25 RX ADMIN — PROPOFOL 100 MG: 10 INJECTION, EMULSION INTRAVENOUS at 08:27

## 2025-07-25 SDOH — HEALTH STABILITY: MENTAL HEALTH: CURRENT SMOKER: 0

## 2025-07-25 ASSESSMENT — PAIN - FUNCTIONAL ASSESSMENT
PAIN_FUNCTIONAL_ASSESSMENT: 0-10

## 2025-07-25 ASSESSMENT — PAIN SCALES - GENERAL
PAINLEVEL_OUTOF10: 0 - NO PAIN
PAIN_LEVEL: 0
PAINLEVEL_OUTOF10: 0 - NO PAIN
PAINLEVEL_OUTOF10: 0 - NO PAIN

## 2025-07-25 NOTE — H&P
History Of Present Illness  Angelica Craven is a 70 y.o. female presenting for egd for dysphagia.     Past Medical History  Medical History[1]    Surgical History  Surgical History[2]     Social History  She reports that she has never smoked. She has never used smokeless tobacco. She reports that she does not drink alcohol and does not use drugs.    Family History  Family History[3]     Allergies  Patient has no known allergies.    Review of Systems     Physical Exam  Constitutional:       General: She is awake.      Appearance: Normal appearance.   HENT:      Head: Normocephalic and atraumatic.      Nose: Nose normal.      Mouth/Throat:      Mouth: Mucous membranes are moist.     Eyes:      Pupils: Pupils are equal, round, and reactive to light.     Neck:      Thyroid: No thyroid mass.      Trachea: Phonation normal.     Cardiovascular:      Rate and Rhythm: Normal rate and regular rhythm.   Pulmonary:      Effort: Pulmonary effort is normal. No respiratory distress.      Breath sounds: Normal air entry. No decreased breath sounds, wheezing, rhonchi or rales.   Abdominal:      General: Bowel sounds are normal. There is no distension.      Palpations: Abdomen is soft.      Tenderness: There is no abdominal tenderness.     Musculoskeletal:      Cervical back: Neck supple.      Right lower leg: No edema.      Left lower leg: No edema.     Skin:     General: Skin is warm.      Capillary Refill: Capillary refill takes less than 2 seconds.     Neurological:      General: No focal deficit present.      Mental Status: She is alert and oriented to person, place, and time. Mental status is at baseline.      Cranial Nerves: Cranial nerves 2-12 are intact.      Motor: Motor function is intact.     Psychiatric:         Attention and Perception: Attention and perception normal.         Mood and Affect: Mood normal.         Speech: Speech normal.         Behavior: Behavior normal.          Last Recorded Vitals  Blood pressure  "130/73, pulse 72, temperature 36.7 °C (98.1 °F), temperature source Temporal, resp. rate 15, height 1.6 m (5' 3\"), weight 53.5 kg (118 lb), SpO2 97%.    Relevant Results      Current Outpatient Medications   Medication Instructions    albuterol (Ventolin HFA) 90 mcg/actuation inhaler 2 puffs, Every 6 hours PRN    alendronate (FOSAMAX) 70 mg, oral, Every 7 days, Take in the morning with a full glass of water, on an empty stomach, and do not take anything else by mouth or lie down for the next 30 min.    amLODIPine (NORVASC) 10 mg, oral, Daily    aspirin 81 mg, Daily RT    atorvastatin (LIPITOR) 40 mg, oral, Daily RT    FLUoxetine (PROZAC) 10 mg, oral, Daily    levothyroxine (SYNTHROID, LEVOXYL) 112 mcg, oral, Daily, Take on an empty stomach at the same time each day, either 30 to 60 minutes prior to breakfast    macitentan (OPSUMIT) 10 mg, Daily RT    multivitamin with minerals tablet 1 tablet, Daily    Kameron 128 5 % ophthalmic ointment 1 Application, Nightly    omega-3 fatty acids-fish oil 340-1,000 mg capsule 1 capsule    pantoprazole (PROTONIX) 40 mg, oral, Daily RT    tadalafil 40 mg, Daily RT          Assessment & Plan  Esophageal dysphagia    Gastroesophageal reflux disease without esophagitis    Scleroderma (Multi)      Egd for evall       Risk and benefits of the endoscopic procedure including bleeding perforation and infection were discussed with patient and they wish to proceed              Orville Rehman DO         [1]   Past Medical History:  Diagnosis Date    Anxiety     Arthritis     Adkins esophagus     Cataract     CHF (congestive heart failure)     Dental disease     Depression     Disease of thyroid gland     GERD (gastroesophageal reflux disease)     Hearing aid worn     HL (hearing loss)     Hyperlipidemia     Hypertension     Pulmonary arterial hypertension (Multi)     Pulmonary fibrosis (Multi)     Scleroderma (Multi)     Shortness of breath     Stroke (Multi)     Vision loss    [2]   Past " Surgical History:  Procedure Laterality Date    BREAST BIOPSY  2015    CARDIAC CATHETERIZATION      ESOPHAGOGASTRODUODENOSCOPY      ESOPHAGUS SURGERY  2019    polyps removed and dilation several times.    LIVER BIOPSY      normal results   [3]   Family History  Problem Relation Name Age of Onset    Coronary artery disease Mother Christie         CABG 3 V    Arthritis Mother Christie     Heart disease Mother Christie     Other (pacemaker) Father Herb     Heart disease Father Elvis     Diabetes Sister Milagros     Asthma Son Omar     Asthma Son Omar     Asthma Son Omar     Asthma Son Krzysztof       no

## 2025-07-25 NOTE — ANESTHESIA PREPROCEDURE EVALUATION
Patient: Angelica Craven    Procedure Information       Date/Time: 07/25/25 0845    Scheduled providers: Orville Rehman DO    Procedure: EGD    Location: [facility] OR            Relevant Problems   Anesthesia (within normal limits)      Cardiac   (+) Essential hypertension      Pulmonary   (+) PAH (pulmonary arterial hypertension) with portal hypertension (Multi)   (+) Pulmonary artery hypertension associated with connective tissue disease      Neuro   (+) CVA (cerebral vascular accident) (Multi)      GI   (+) Gastroesophageal reflux disease without esophagitis       Clinical information reviewed:   Tobacco  Allergies  Meds   Med Hx  Surg Hx   Fam Hx  Soc Hx        NPO Detail:  NPO/Void Status  Date of Last Liquid: 07/24/25  Time of Last Liquid: 2100  Date of Last Solid: 07/24/25  Time of Last Solid: 0210  Time of Last Void: 0733         Physical Exam    Airway  Mallampati: II  TM distance: >3 FB  Neck ROM: full  Mouth opening: 3 or more finger widths     Cardiovascular - normal exam   Dental     (+) upper dentures       Pulmonary - normal exam   Abdominal - normal exam           Anesthesia Plan    History of general anesthesia?: yes  History of complications of general anesthesia?: no    ASA 3     MAC     The patient is not a current smoker.    intravenous induction   Anesthetic plan and risks discussed with patient.    Plan discussed with CRNA.

## 2025-07-25 NOTE — ANESTHESIA POSTPROCEDURE EVALUATION
Patient: Angelica Craven    Procedure Summary       Date: 07/25/25 Room / Location: [facility] OR    Anesthesia Start: 0824 Anesthesia Stop: 0836    Procedure: EGD Diagnosis:       Esophageal dysphagia      Gastroesophageal reflux disease without esophagitis      Scleroderma (Multi)    Scheduled Providers: Orville Rehman DO Responsible Provider: CHAZ Moody-CRNA    Anesthesia Type: MAC ASA Status: 3            Anesthesia Type: MAC    Vitals Value Taken Time   /74 07/25/25 09:00   Temp 36.2 °C (97.1 °F) 07/25/25 09:00   Pulse 62 07/25/25 09:00   Resp 17 07/25/25 09:00   SpO2 94 % 07/25/25 09:00       Anesthesia Post Evaluation    Patient location during evaluation: PACU  Patient participation: complete - patient participated  Level of consciousness: awake  Pain score: 0  Pain management: adequate  Airway patency: patent  Cardiovascular status: acceptable  Respiratory status: acceptable  Hydration status: acceptable  Postoperative Nausea and Vomiting: none        No notable events documented.

## 2025-07-31 LAB
LABORATORY COMMENT REPORT: NORMAL
PATH REPORT.FINAL DX SPEC: NORMAL
PATH REPORT.GROSS SPEC: NORMAL
PATH REPORT.RELEVANT HX SPEC: NORMAL
PATH REPORT.TOTAL CANCER: NORMAL

## 2025-08-01 ENCOUNTER — TELEPHONE (OUTPATIENT)
Facility: CLINIC | Age: 70
End: 2025-08-01
Payer: MEDICARE

## 2025-08-01 DIAGNOSIS — R14.0 BLOATING: ICD-10-CM

## 2025-08-01 DIAGNOSIS — R19.7 DIARRHEA DUE TO MALABSORPTION (HHS-HCC): Primary | ICD-10-CM

## 2025-08-01 DIAGNOSIS — K90.9 DIARRHEA DUE TO MALABSORPTION (HHS-HCC): Primary | ICD-10-CM

## 2025-08-01 NOTE — TELEPHONE ENCOUNTER
Patient called and said you wanted her to have SIBO test.  Can you please put the order in?    I did give her the # for [facility].  [phone]. She would like to call them on 8/4/25 to try to schedule for 9/3/25 when she has esophageal manometry scheduled

## 2025-08-13 DIAGNOSIS — M81.0 AGE-RELATED OSTEOPOROSIS WITHOUT CURRENT PATHOLOGICAL FRACTURE: ICD-10-CM

## 2025-08-14 RX ORDER — ALENDRONATE SODIUM 70 MG/1
TABLET ORAL
Qty: 12 TABLET | Refills: 1 | Status: SHIPPED | OUTPATIENT
Start: 2025-08-14

## 2025-08-24 ASSESSMENT — RHEUMATOLOGY NEW PATIENT QUESTIONNAIRE
ANXIETY: Y
INCREASED SUSCEPTIBILITY TO INFECTION: N
RASH OR ULCERS: N
MORNING STIFFNESS: N
DEPRESSION: N
PAIN OR BURNING ON URINATION: N
JOINT SWELLING: N
RASH: N
EXCESSIVE HAIR LOSS (MORE THAN YOUR NORM): N
UNUSUAL BLEEDING: N
MUSCLE WEAKNESS: Y
DIFFICULTY FALLING ASLEEP: N
NAUSEA: Y
DRYNESS OF MOUTH: Y
SWOLLEN OR TENDER GLANDS: N
WHEEZING: Y
SUN SENSITIVE (SUN ALLERGY): N
EASILY LOSING TEMPER: N
ABNORMAL URINE: N
HEARTBURN OR REFLUX: Y
FAINTING: N
EASY BRUISING: Y
UNUSUAL FATIGUE: Y
ANEMIA: N
SKIN TIGHTNESS: Y
DIFFICULTY STAYING ASLEEP: Y
SEIZURES: N
HEADACHES: N
NUMBNESS OR TINGLING IN HANDS OR FEET: Y
JOINT PAIN: N
UNEXPLAINED WEIGHT CHANGE: Y
BEHAVIORAL CHANGES: N
COUGH: Y
COLOR CHANGES OF HANDS OR FEET IN THE COLD: Y
SKIN REDNESS: N
MEMORY LOSS: Y
VAGINAL DRYNESS: N
NODULES/BUMPS: N
AGITATION: Y

## 2025-08-25 ENCOUNTER — APPOINTMENT (OUTPATIENT)
Dept: RHEUMATOLOGY | Facility: CLINIC | Age: 70
End: 2025-08-25
Payer: MEDICARE

## 2025-08-25 VITALS
WEIGHT: 117.4 LBS | DIASTOLIC BLOOD PRESSURE: 74 MMHG | SYSTOLIC BLOOD PRESSURE: 117 MMHG | OXYGEN SATURATION: 99 % | BODY MASS INDEX: 20.8 KG/M2 | HEART RATE: 88 BPM | HEIGHT: 63 IN

## 2025-08-25 DIAGNOSIS — M34.9 SCLERODERMA (MULTI): Primary | ICD-10-CM

## 2025-08-25 DIAGNOSIS — R49.9 HOARSENESS OR CHANGING VOICE: ICD-10-CM

## 2025-08-25 PROCEDURE — G2211 COMPLEX E/M VISIT ADD ON: HCPCS | Performed by: INTERNAL MEDICINE

## 2025-08-25 PROCEDURE — 99214 OFFICE O/P EST MOD 30 MIN: CPT | Performed by: INTERNAL MEDICINE

## 2025-08-25 PROCEDURE — 3074F SYST BP LT 130 MM HG: CPT | Performed by: INTERNAL MEDICINE

## 2025-08-25 PROCEDURE — 1126F AMNT PAIN NOTED NONE PRSNT: CPT | Performed by: INTERNAL MEDICINE

## 2025-08-25 PROCEDURE — 1159F MED LIST DOCD IN RCRD: CPT | Performed by: INTERNAL MEDICINE

## 2025-08-25 PROCEDURE — 3008F BODY MASS INDEX DOCD: CPT | Performed by: INTERNAL MEDICINE

## 2025-08-25 PROCEDURE — 3078F DIAST BP <80 MM HG: CPT | Performed by: INTERNAL MEDICINE

## 2025-08-25 ASSESSMENT — ENCOUNTER SYMPTOMS
FATIGUE: 1
DIARRHEA: 1
APPETITE CHANGE: 1
UNEXPECTED WEIGHT CHANGE: 1
FREQUENCY: 1

## 2025-08-25 ASSESSMENT — PAIN SCALES - GENERAL: PAINLEVEL_OUTOF10: 0-NO PAIN

## 2025-09-03 ENCOUNTER — APPOINTMENT (OUTPATIENT)
Dept: GASTROENTEROLOGY | Facility: HOSPITAL | Age: 70
End: 2025-09-03
Payer: MEDICARE

## 2025-10-02 ENCOUNTER — APPOINTMENT (OUTPATIENT)
Dept: PRIMARY CARE | Facility: CLINIC | Age: 70
End: 2025-10-02
Payer: MEDICARE

## 2026-02-23 ENCOUNTER — APPOINTMENT (OUTPATIENT)
Dept: RHEUMATOLOGY | Facility: CLINIC | Age: 71
End: 2026-02-23
Payer: MEDICARE

## 2026-04-02 ENCOUNTER — APPOINTMENT (OUTPATIENT)
Dept: PRIMARY CARE | Facility: CLINIC | Age: 71
End: 2026-04-02
Payer: MEDICARE